# Patient Record
Sex: FEMALE | Race: WHITE | NOT HISPANIC OR LATINO | Employment: FULL TIME | URBAN - METROPOLITAN AREA
[De-identification: names, ages, dates, MRNs, and addresses within clinical notes are randomized per-mention and may not be internally consistent; named-entity substitution may affect disease eponyms.]

---

## 2017-10-30 ENCOUNTER — OFFICE VISIT (OUTPATIENT)
Dept: FAMILY MEDICINE | Facility: CLINIC | Age: 32
End: 2017-10-30
Payer: COMMERCIAL

## 2017-10-30 VITALS
SYSTOLIC BLOOD PRESSURE: 120 MMHG | HEART RATE: 90 BPM | HEIGHT: 64 IN | BODY MASS INDEX: 24.5 KG/M2 | WEIGHT: 143.5 LBS | TEMPERATURE: 98.6 F | RESPIRATION RATE: 12 BRPM | DIASTOLIC BLOOD PRESSURE: 74 MMHG
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
## 2017-10-30 DIAGNOSIS — S83.91XA SPRAIN OF RIGHT KNEE, UNSPECIFIED LIGAMENT, INITIAL ENCOUNTER: Primary | ICD-10-CM

## 2017-10-30 DIAGNOSIS — M25.561 RIGHT KNEE PAIN, UNSPECIFIED CHRONICITY: ICD-10-CM

## 2017-10-30 DIAGNOSIS — Z53.9 ERRONEOUS ENCOUNTER--DISREGARD: Primary | ICD-10-CM

## 2017-10-30 PROCEDURE — 99203 OFFICE O/P NEW LOW 30 MIN: CPT | Performed by: FAMILY MEDICINE

## 2017-10-30 RX ORDER — NORETHINDRONE ACETATE AND ETHINYL ESTRADIOL 1.5-30(21)
KIT ORAL
COMMUNITY
Start: 2009-01-12 | End: 2019-09-23

## 2017-10-30 RX ORDER — NAPROXEN 500 MG/1
500 TABLET ORAL 2 TIMES DAILY WITH MEALS
Qty: 14 TABLET | Refills: 0 | Status: SHIPPED | OUTPATIENT
Start: 2017-10-30 | End: 2017-11-06

## 2017-10-30 NOTE — PROGRESS NOTES
SUBJECTIVE:   Denia Raymond is a 32 year old female who presents to clinic today for the following health issues:      Musculoskeletal problem/pain      Duration: 2 days started yesterday     Description  Location: right knee    Intensity:  moderate    Accompanying signs and symptoms: radiation of pain to  Calf and quads and little swollen    History  Previous similar problem: YES  Previous evaluation:  none    Precipitating or alleviating factors:  Trauma or overuse: YES- walking   Aggravating factors include: standing, walking, climbing stairs, lifting and exercise    Therapies tried and outcome: NSAID - over the counter and tylenol     Noted when walking dog, turned quickly to right and knee and foot didn't move with it. [place] a couple rips inner side of left knee, then started hurting immediately , no swelling per say mild at least .   In past not like this , had PT for right knee when younger , told her quads were developing quicker than calf so knee cap drifting to out and had bone on bone so did PT to strengthen lower calf.   Able to walk on it. Some better than other . Pain ful , not able to walk normally full weight. stairs are hard farida going up stairs. Can flex & Can extend fully but feels funny  Feels like might give way. May buckle on her , not locking up. Stiff from being up last night.   Is a skier    getting over cold,     Moved from [place] last yr. Pap done 2 yrs ago in [place] normal  Gets flu and tdap at work   Works at the [employer] .     Problem list and histories reviewed & adjusted, as indicated.  Additional history: as documented    Patient Active Problem List   Diagnosis     Acne     History reviewed. No pertinent surgical history.    Social History   Substance Use Topics     Smoking status: Never Smoker     Smokeless tobacco: Never Used     Alcohol use Yes      Comment: ocasional     Family History   Problem Relation Age of Onset     DIABETES Father          Current Outpatient Prescriptions  "  Medication Sig Dispense Refill     norethindrone-ethinyl estradiol-iron (LOESTRIN FE 1.5/30) 1.5-30 MG-MCG per tablet        Allergies   Allergen Reactions     Penicillins Rash     rash     No lab results found.   BP Readings from Last 3 Encounters:   10/30/17 120/74    Wt Readings from Last 3 Encounters:   10/30/17 143 lb 8 oz (65.1 kg)                  Labs reviewed in EPIC          Reviewed and updated as needed this visit by clinical staffTobacco  Allergies  Med Hx  Surg Hx  Fam Hx  Soc Hx      Reviewed and updated as needed this visit by Provider         ROS:  Constitutional, HEENT, cardiovascular, pulmonary, GI, , musculoskeletal, neuro, skin, endocrine and psych systems are negative, except as otherwise noted.      OBJECTIVE:   /74 (BP Location: Left arm, Patient Position: Chair, Cuff Size: Adult Regular)  Pulse 90  Temp 98.6  F (37  C) (Oral)  Resp 12  Ht 5' 3.9\" (1.623 m)  Wt 143 lb 8 oz (65.1 kg)  BMI 24.71 kg/m2  Body mass index is 24.71 kg/(m^2).  GENERAL: healthy, alert and no distress  MS: no gross musculoskeletal defects noted, no edema  Right  knee:  Swelling: minimal left medial joint space area  Ecchymosis: no  Gait: stiff, walks with right knee extended,  Squat: unable  Medial tenderness: yes  Lateral tenderness: no  Anterior tenderness: no  Posterior tenderness: no  Extension: almost full  Flexion: almost full  Patellar tracking / apprehension: no  Chanucey's Patellar compression test: no  Mc Chavez's: neg  Lachman's / Anterior drawer: neg  Valgus stress: neg  Varus stress: neg  ITB testing: not tested     Distal CMS: intact  Ipsilateral Hip: full range of motion   Ipsilateral Ankle & Foot: not examined       SKIN: no suspicious lesions or rashes  NEURO: Normal strength and tone, mentation intact and speech normal  PSYCH: mentation appears normal, affect normal/bright    Diagnostic Test Results:  No results found for this or any previous visit (from the past 24 " hour(s)).    ASSESSMENT/PLAN:     1. Sprain of right knee, unspecified ligament, initial encounter  New to this clinic and myself, no records to review prior to apt time and apt time limited. [place]  negative. Here about her right knee.   Exam benign, suspect knee sprain/ mild meniscal medial injury. Xray knee today at [facility] or here later as our machine is down. Ice then heat twice a day as needed, ace wrap for comfort, elevate, Brace and crutches , toe touch weight bearing until see specialists. See physical therapy and orthopedics for further evaluation and management. Go to the ER if worse. Return to primary of choice for physical / pap etc. Recommend flu shot yearly. Due also for Tdap. Reports will get at the  where works. Sign a release so we can update records if planning to continue care here. Moved from [place]. PAP normal 2 yrs ago there.  - XR Knee Right 3 Views; Future  - [facility] PT, HAND, AND CHIROPRACTIC REFERRAL  - ORTHO  REFERRAL  - order for DME; Knee brace  Dispense: 1 each; Refill: 0  - order for DME; crutches  Dispense: 1 each; Refill: 0  - naproxen (NAPROSYN) 500 MG tablet; Take 1 tablet (500 mg) by mouth 2 times daily (with meals) for 7 days  Dispense: 14 tablet; Refill: 0    2. Right knee pain, unspecified chronicity  - XR Knee Right 3 Views; Future  - [facility] PT, HAND, AND CHIROPRACTIC REFERRAL  - ORTHO  REFERRAL  - order for DME; Knee brace  Dispense: 1 each; Refill: 0  - order for DME; crutches  Dispense: 1 each; Refill: 0  - naproxen (NAPROSYN) 500 MG tablet; Take 1 tablet (500 mg) by mouth 2 times daily (with meals) for 7 days  Dispense: 14 tablet; Refill: 0    3. Skier  Should see ortho so can ski in the future and this injury raza snot compromise her functioning  - XR Knee Right 3 Views; Future  - [facility] PT, HAND, AND CHIROPRACTIC REFERRAL  - ORTHO  REFERRAL  - order for DME; Knee brace  Dispense: 1 each; Refill: 0  - order for DME; crutches  Dispense: 1 each; Refill:  0  - naproxen (NAPROSYN) 500 MG tablet; Take 1 tablet (500 mg) by mouth 2 times daily (with meals) for 7 days  Dispense: 14 tablet; Refill: 0    See Patient Instructions  Patient Instructions   Xray knee   Brace and crutches , toe touch weight bearing until see specialist   See physical therapy and orthopedics for further evaluation and management  Go to the ER if worse  Return to primary of choice for physical / pap etc  Recommend flu shot yearly   Due also for Tdap  Sign a release so we can update records if planning to continue care here       Arlene Samayoa MD  [facility]

## 2017-10-30 NOTE — MR AVS SNAPSHOT
After Visit Summary   10/30/2017    Denia Raymond    MRN: [id number]           Patient Information     Date Of Birth          1985        Visit Information        Provider Department      10/30/2017 1:40 PM Arlene Samayoa MD [facility]        Today's Diagnoses     Sprain of right knee, unspecified ligament, initial encounter    -  1    Right knee pain, unspecified chronicity        Skier          Care Instructions    Xray knee   Brace and crutches , toe touch weight bearing until see specialist   See physical therapy and orthopedics for further evaluation and management  Go to the ER if worse  Return to primary of choice for physical / pap etc  Recommend flu shot yearly   Due also for Tdap  Sign a release so we can update records if planning to continue care here           Follow-ups after your visit        Additional Services     [facility] PT, HAND, AND CHIROPRACTIC REFERRAL       **This order will print in the [facility] Scheduling Office**    Physical Therapy, Hand Therapy and Chiropractic Care are available through:    *[facility]  *[facility]  *[facility]    Call one number to schedule at any of the above locations: [phone].    Your provider has referred you to: Physical Therapy at [facility] or [facility]    Indication/Reason for Referral: Knee Pain  Onset of Illness: ?  Therapy Orders: Evaluate and Treat  Special Programs: None  Special Request: None    Ezekiel Falcon      Additional Comments for the Therapist or Chiropractor:     Please be aware that coverage of these services is subject to the terms and limitations of your health insurance plan.  Call member services at your health plan with any benefit or coverage questions.      Please bring the following to your appointment:    *Your personal calendar for scheduling future appointments  *Comfortable clothing            ORTHO  REFERRAL       [facility] is  referring you to the Orthopedic  Services at [facility].       The  Representative will assist you in the coordination of your Orthopedic and Musculoskeletal Care as prescribed by your physician.    The  Representative will call you within 1 business day to help schedule your appointment, or you may contact the  Representative at:    All areas ~ [phone]     Type of Referral : Non Surgical       Timeframe requested: 3 - 5 days    Coverage of these services is subject to the terms and limitations of your health insurance plan.  Please call member services at your health plan with any benefit or coverage questions.      If X-rays, CT or MRI's have been performed, please contact the facility where they were done to arrange for , prior to your scheduled appointment.  Please bring this referral request to your appointment and present it to your specialist.                  Future tests that were ordered for you today     Open Future Orders        Priority Expected Expires Ordered    XR Knee Right 3 Views Routine 10/30/2017 10/30/2018 10/30/2017            Who to contact     If you have questions or need follow up information about today's clinic visit or your schedule please contact [facility] directly at [phone].  Normal or non-critical lab and imaging results will be communicated to you by MyChart, letter or phone within 4 business days after the clinic has received the results. If you do not hear from us within 7 days, please contact the clinic through MyChart or phone. If you have a critical or abnormal lab result, we will notify you by phone as soon as possible.  Submit refill requests through [employer] or call your pharmacy and they will forward the refill request to us. Please allow 3 business days for your refill to be completed.          Additional Information About Your Visit        [employer] Information     [employer] lets  "you send messages to your doctor, view your test results, renew your prescriptions, schedule appointments and more. To sign up, go to [email or web address] . Click on \"Log in\" on the left side of the screen, which will take you to the Welcome page. Then click on \"Sign up Now\" on the right side of the page.     You will be asked to enter the access code listed below, as well as some personal information. Please follow the directions to create your username and password.     Your access code is: [email or web address]  Expires: 2018  2:19 PM     Your access code will  in 90 days. If you need help or a new code, please call your [facility] [place] clinic or [phone].        Care EveryWhere ID     This is your Care EveryWhere ID. This could be used by other organizations to access your [facility] [place] medical records  [id number]        Your Vitals Were     Pulse Temperature Respirations Height BMI (Body Mass Index)       90 98.6  F (37  C) (Oral) 12 5' 3.9\" (1.623 m) 24.71 kg/m2        Blood Pressure from Last 3 Encounters:   10/30/17 120/74    Weight from Last 3 Encounters:   10/30/17 143 lb 8 oz (65.1 kg)              We Performed the Following     [facility] PT, HAND, AND CHIROPRACTIC REFERRAL     ORTHO  REFERRAL          Today's Medication Changes          These changes are accurate as of: 10/30/17  2:19 PM.  If you have any questions, ask your nurse or doctor.               Start taking these medicines.        Dose/Directions    naproxen 500 MG tablet   Commonly known as:  NAPROSYN   Used for:  Sprain of right knee, unspecified ligament, initial encounter, Right knee pain, unspecified chronicity, Skier   Started by:  Arlene Samayoa MD        Dose:  500 mg   Take 1 tablet (500 mg) by mouth 2 times daily (with meals) for 7 days   Quantity:  14 tablet   Refills:  0       * order for DME   Used for:  Right knee pain, unspecified chronicity, Sprain of right knee, unspecified ligament, initial encounter, Skier   Started by:  " Arlene Samayoa MD        Knee brace   Quantity:  1 each   Refills:  0       * order for DME   Used for:  Sprain of right knee, unspecified ligament, initial encounter, Right knee pain, unspecified chronicity, Skier   Started by:  Arlene Samayoa MD        crutches   Quantity:  1 each   Refills:  0       * Notice:  This list has 2 medication(s) that are the same as other medications prescribed for you. Read the directions carefully, and ask your doctor or other care provider to review them with you.         Where to get your medicines      These medications were sent to [id number] - [place], [place] - [address], [place] [address]     Phone:  [phone]     naproxen 500 MG tablet         Some of these will need a paper prescription and others can be bought over the counter.  Ask your nurse if you have questions.     Bring a paper prescription for each of these medications     order for DME    order for DME                Primary Care Provider    None Specified       No primary provider on file.        Equal Access to Services     MANISHA TANNER : Hadii gillian ku hadasho Soomaali, waaxda luqadaha, qaybta kaalmada adeegyada, waxay steve dejesus . So [facility] [phone].    ATENCIÓN: Si habla español, tiene a salgado disposición servicios gratuitos de asistencia lingüística. Llame al [phone].    We comply with applicable federal civil rights laws and [place] laws. We do not discriminate on the basis of race, color, national origin, age, disability, sex, sexual orientation, or gender identity.            Thank you!     Thank you for choosing [facility]  for your care. Our goal is always to provide you with excellent care. Hearing back from our patients is one way we can continue to improve our services. Please take a few minutes to complete the written survey that you may receive in the mail after your visit with us.  Thank you!             Your Updated Medication List - Protect others around you: Learn how to safely use, store and throw away your medicines at www.disposemymeds.org.          This list is accurate as of: 10/30/17  2:19 PM.  Always use your most recent med list.                   Brand Name Dispense Instructions for use Diagnosis    LOESTRIN FE 1.5/30 1.5-30 MG-MCG per tablet   Generic drug:  norethindrone-ethinyl estradiol-iron           naproxen 500 MG tablet    NAPROSYN    14 tablet    Take 1 tablet (500 mg) by mouth 2 times daily (with meals) for 7 days    Sprain of right knee, unspecified ligament, initial encounter, Right knee pain, unspecified chronicity, Skier       * order for DME     1 each    Knee brace    Right knee pain, unspecified chronicity, Sprain of right knee, unspecified ligament, initial encounter, Skier       * order for DME     1 each    crutches    Sprain of right knee, unspecified ligament, initial encounter, Right knee pain, unspecified chronicity, Skier       * Notice:  This list has 2 medication(s) that are the same as other medications prescribed for you. Read the directions carefully, and ask your doctor or other care provider to review them with you.

## 2017-10-30 NOTE — NURSING NOTE
"Chief Complaint   Patient presents with     Musculoskeletal Problem     knee       Initial /74 (BP Location: Left arm, Patient Position: Chair, Cuff Size: Adult Regular)  Pulse 90  Temp 98.6  F (37  C) (Oral)  Resp 12  Ht 5' 3.9\" (1.623 m)  Wt 143 lb 8 oz (65.1 kg)  BMI 24.71 kg/m2 Estimated body mass index is 24.71 kg/(m^2) as calculated from the following:    Height as of this encounter: 5' 3.9\" (1.623 m).    Weight as of this encounter: 143 lb 8 oz (65.1 kg).  Medication Reconciliation: complete Jeffery Aquino MA      "

## 2017-11-06 ENCOUNTER — THERAPY VISIT (OUTPATIENT)
Dept: PHYSICAL THERAPY | Facility: CLINIC | Age: 32
End: 2017-11-06
Payer: COMMERCIAL

## 2017-11-06 DIAGNOSIS — M25.561 ACUTE PAIN OF RIGHT KNEE: Primary | ICD-10-CM

## 2017-11-06 PROCEDURE — 97110 THERAPEUTIC EXERCISES: CPT | Mod: GP | Performed by: PHYSICAL THERAPIST

## 2017-11-06 PROCEDURE — 97161 PT EVAL LOW COMPLEX 20 MIN: CPT | Mod: GP | Performed by: PHYSICAL THERAPIST

## 2017-11-06 ASSESSMENT — ACTIVITIES OF DAILY LIVING (ADL)
KNEE_ACTIVITY_OF_DAILY_LIVING_SUM: 28
AS_A_RESULT_OF_YOUR_KNEE_INJURY,_HOW_WOULD_YOU_RATE_YOUR_CURRENT_LEVEL_OF_DAILY_ACTIVITY?: ABNORMAL
WEAKNESS: THE SYMPTOM AFFECTS MY ACTIVITY SLIGHTLY
SIT WITH YOUR KNEE BENT: I AM UNABLE TO DO THE ACTIVITY
STAND: ACTIVITY IS NOT DIFFICULT
KNEEL ON THE FRONT OF YOUR KNEE: I AM UNABLE TO DO THE ACTIVITY
GIVING WAY, BUCKLING OR SHIFTING OF KNEE: THE SYMPTOM AFFECTS MY ACTIVITY SLIGHTLY
STIFFNESS: THE SYMPTOM AFFECTS MY ACTIVITY MODERATELY
GO DOWN STAIRS: ACTIVITY IS VERY DIFFICULT
PAIN: THE SYMPTOM AFFECTS MY ACTIVITY MODERATELY
GO UP STAIRS: ACTIVITY IS VERY DIFFICULT
SQUAT: I AM UNABLE TO DO THE ACTIVITY
LIMPING: THE SYMPTOM AFFECTS MY ACTIVITY MODERATELY
KNEE_ACTIVITY_OF_DAILY_LIVING_SCORE: 40
HOW_WOULD_YOU_RATE_THE_OVERALL_FUNCTION_OF_YOUR_KNEE_DURING_YOUR_USUAL_DAILY_ACTIVITIES?: ABNORMAL
WALK: ACTIVITY IS SOMEWHAT DIFFICULT
SWELLING: THE SYMPTOM AFFECTS MY ACTIVITY SLIGHTLY
RISE FROM A CHAIR: ACTIVITY IS SOMEWHAT DIFFICULT
RAW_SCORE: 28
HOW_WOULD_YOU_RATE_THE_CURRENT_FUNCTION_OF_YOUR_KNEE_DURING_YOUR_USUAL_DAILY_ACTIVITIES_ON_A_SCALE_FROM_0_TO_100_WITH_100_BEING_YOUR_LEVEL_OF_KNEE_FUNCTION_PRIOR_TO_YOUR_INJURY_AND_0_BEING_THE_INABILITY_TO_PERFORM_ANY_OF_YOUR_USUAL_DAILY_ACTIVITIES?: 70

## 2017-11-06 NOTE — PROGRESS NOTES
Subjective:    Patient is a 32 year old female presenting with rehab right knee hpi.   Denia Raymond is a 32 year old female with a right knee condition.  Condition occurred with:  A twist.  Condition occurred: in the community.  This is a new condition  October 29, 2017 - pt was walking and twisted to the right but her foot did not move so she twisted her R knee. [place]/felt 3 rips. Immediate pain but never noted any swelling or bruising. Was able to still walk home. Pain is inside and above knee. Was given crutches but not using much anymore. Has been wearing knee brace though.   Is a skier and a runner and and would like to return to run a few miles a few times a week. Would like to return to elliptical/bike.  Would like to be able to bike from work to here..    Patient reports pain:  Medial (superior).  Radiates to: first few nights had pain up the thigh and into hip, but that is better now.  Pain is described as aching (occasional sharp pain above knee) and is intermittent and reported as 4/10 (at worst recently).  Associated symptoms:  Loss of motion/stiffness (did not notice swelling). Pain is worse during the day.  Symptoms are exacerbated by ascending stairs, descending stairs, bending/squatting, walking and other (sitting with knee bent ) and relieved by ice and bracing/immobilizing.  Since onset symptoms are gradually improving.  Special testing: none.  Previous treatment: none.  Improvement with previous treatment: NA.  General health as reported by patient is good.                                              Objective:      Gait:  Has crutches but really not using them much  Gait Type:  Antalgic   Weight Bearing Status:  WBAT   Assistive Devices:  Brace  Deviations:  Knee:  Knee extension decr R                                                 Hip Evaluation    Hip Strength:    Flexion:   Left: 5/5   Pain:  Right: 5-/5   Pain:                    Extension:  Left: 5/5  Pain:Right: 5-/5    Pain:     Abduction:  Left: 5-/5     Pain:Right: 4+/5    Pain:                           Knee Evaluation:  ROM:    AROM    Hyperextension: Left:  2    Right:  Extension: Left:    Right:  11    PROM    Hyperextension: Left: 2   Right:   Extension: Left:   Right:  3  Flexion: Left: 140    Right:  140      Strength:     Extension:  Left: 5/5   Pain:      Right: 4-/5    Pain:++  Flexion:  Left: 5/5   Pain:      Right: 5/5   Pain:    Quad Set Left: Good    Pain:   Quad Set Right: Fair    Pain:  Ligament Testing:      Varus 30:  Right:  Neg    Valgus 30:  Right:  Neg  Anterior Drawer:  Right:  Neg  Posterior Drawer: Right:  Neg    Special Tests: Special test for knee: (-) McMurrays and (-) Apleys.      Right knee negative for the following special tests:  Meniscal and Patellar Compression  Palpation:  Palpation of knee: (-) TTP at medial femoral condyle and medial fat pad. (+) TTP 1 in prox to superior patella pole (no tenderness on patella). but no tenderness proximally into quadriceps.    Right knee tenderness present at:  Patellar Superior  Right knee tenderness not present at:  Medial Joint Line; Lateral Joint Line; Patellar Tendon; Patellar Medial; Patellar Lateral and Patellar Inferior  Edema:  Normal    Mobility Testing:  Normal            Functional Testing:          Quad:    Single Leg Squat:  Left:       Right:        Bilateral Leg Squat:  Partial depth only, shift into L LE, mild superior knee pain  Mild loss of control              General   Repeated knee extension in sitting with OP: results in large decr in pain with gait, decr antalgic gait. incr MMT knee extension to 5-/5 and significant decr in pain, and PROM achieves full extension  ROS    Assessment/Plan:      Patient is a 32 year old female with right side knee complaints.    Patient has the following significant findings with corresponding treatment plan.                Diagnosis 1:  Right knee pain/derangement    Pain -  hot/cold therapy,  splint/taping/bracing/orthotics, self management, education, directional preference exercise and home program  Decreased ROM/flexibility - manual therapy, therapeutic exercise and home program  Decreased strength - therapeutic exercise, therapeutic activities and home program  Impaired gait - home program and therapeutic exercise  Impaired muscle performance - neuro re-education and home program  Decreased function - therapeutic activities and home program    Therapy Evaluation Codes:   1) History comprised of:   Personal factors that impact the plan of care:      None.    Comorbidity factors that impact the plan of care are:      None.     Medications impacting care: None.  2) Examination of Body Systems comprised of:   Body structures and functions that impact the plan of care:      Knee.   Activity limitations that impact the plan of care are:      Running, Sitting, Sports, Squatting/kneeling, Stairs and Walking.  3) Clinical presentation characteristics are:   Stable/Uncomplicated.  4) Decision-Making    Low complexity using standardized patient assessment instrument and/or measureable assessment of functional outcome.  Cumulative Therapy Evaluation is: Low complexity.    Previous and current functional limitations:  (See Goal Flow Sheet for this information)    Short term and Long term goals: (See Goal Flow Sheet for this information)     Communication ability:  Patient appears to be able to clearly communicate and understand verbal and written communication and follow directions correctly.  Treatment Explanation - The following has been discussed with the patient:   RX ordered/plan of care  Anticipated outcomes  Possible risks and side effects  This patient would benefit from PT intervention to resume normal activities.   Rehab potential is excellent.    Frequency:  1 X week, once daily  Duration:  for 4 weeks tapering to 2 X a month over 6 weeks  Discharge Plan:  Achieve all LTG.  Independent in home  treatment program.  Reach maximal therapeutic benefit.    Please refer to the daily flowsheet for treatment today, total treatment time and time spent performing 1:1 timed codes.

## 2017-11-06 NOTE — MR AVS SNAPSHOT
"              After Visit Summary   11/6/2017    Denia Raymond    MRN: [id number]           Patient Information     Date Of Birth          1985        Visit Information        Provider Department      11/6/2017 4:40 PM Melissa Jimenez PT [facility]        Today's Diagnoses     Acute pain of right knee    -  1       Follow-ups after your visit        Your next 10 appointments already scheduled     Nov 13, 2017  4:40 PM CST   CORAL Extremity with Melissa Jimenez PT   [facility] ([facility])    [address] [place] [id number] [phone]            Nov 20, 2017  4:40 PM CST   CORAL Extremity with Melissa Jimenez PT   [facility] ([facility])    [address] [place] [id number] [phone]              Who to contact     If you have questions or need follow up information about today's clinic visit or your schedule please contact [facility] directly at [phone].  Normal or non-critical lab and imaging results will be communicated to you by SportsBUZZhart, letter or phone within 4 business days after the clinic has received the results. If you do not hear from us within 7 days, please contact the clinic through WTFastt or phone. If you have a critical or abnormal lab result, we will notify you by phone as soon as possible.  Submit refill requests through EnzymeRx or call your pharmacy and they will forward the refill request to us. Please allow 3 business days for your refill to be completed.          Additional Information About Your Visit        SportsBUZZhart Information     EnzymeRx lets you send messages to your doctor, view your test results, renew your prescriptions, schedule appointments and more. To sign up, go to [email or web address] . Click on \"Log in\" on the left side of the screen, which will take you to the Welcome " "page. Then click on \"Sign up Now\" on the right side of the page.     You will be asked to enter the access code listed below, as well as some personal information. Please follow the directions to create your username and password.     Your access code is: [email or web address]  Expires: 2018  1:19 PM     Your access code will  in 90 days. If you need help or a new code, please call your [place] clinic or [phone].        Care EveryWhere ID     This is your Care EveryWhere ID. This could be used by other organizations to access your [place] medical records  [id number]         Blood Pressure from Last 3 Encounters:   10/30/17 120/74    Weight from Last 3 Encounters:   10/30/17 65.1 kg (143 lb 8 oz)              We Performed the Following     HC PT EVAL, LOW COMPLEXITY     CORAL INITIAL EVAL REPORT     THERAPEUTIC EXERCISES        Primary Care Provider    None Specified       No primary provider on file.        Equal Access to Services     MEAGHAN TANNER : Hadii gillian Mays, waaxda andrew, qaybta kaalmada adewally, eneraj dejesus . So [facility] [phone].    ATENCIÓN: Si habla español, tiene a salgado disposición servicios gratuitos de asistencia lingüística. Llame al [phone].    We comply with applicable federal civil rights laws and [place] laws. We do not discriminate on the basis of race, color, national origin, age, disability, sex, sexual orientation, or gender identity.            Thank you!     Thank you for choosing [facility] [place]  for your care. Our goal is always to provide you with excellent care. Hearing back from our patients is one way we can continue to improve our services. Please take a few minutes to complete the written survey that you may receive in the mail after your visit with us. Thank you!             Your Updated Medication List - Protect others around you: Learn how to safely use, store and throw away your medicines at " www.disposemymeds.org.          This list is accurate as of: 11/6/17  8:17 PM.  Always use your most recent med list.                   Brand Name Dispense Instructions for use Diagnosis    LOESTRIN FE 1.5/30 1.5-30 MG-MCG per tablet   Generic drug:  norethindrone-ethinyl estradiol-iron           naproxen 500 MG tablet    NAPROSYN    14 tablet    Take 1 tablet (500 mg) by mouth 2 times daily (with meals) for 7 days    Sprain of right knee, unspecified ligament, initial encounter, Right knee pain, unspecified chronicity, Skier       * order for DME     1 each    Knee brace    Right knee pain, unspecified chronicity, Sprain of right knee, unspecified ligament, initial encounter, Skier       * order for DME     1 each    crutches    Sprain of right knee, unspecified ligament, initial encounter, Right knee pain, unspecified chronicity, Skier       * Notice:  This list has 2 medication(s) that are the same as other medications prescribed for you. Read the directions carefully, and ask your doctor or other care provider to review them with you.

## 2017-11-07 NOTE — PROGRESS NOTES
Subjective:    Patient is a 32 year old female presenting with rehab left ankle/foot hpi.                    and reported as 4/10.                General health as reported by patient is good.        Current medications:  Anti-inflammatory.  Current occupation is [employer].                                    Objective:    System    Physical Exam    General     ROS    Assessment/Plan:

## 2017-11-13 ENCOUNTER — THERAPY VISIT (OUTPATIENT)
Dept: PHYSICAL THERAPY | Facility: CLINIC | Age: 32
End: 2017-11-13
Payer: COMMERCIAL

## 2017-11-13 DIAGNOSIS — M25.561 ACUTE PAIN OF RIGHT KNEE: ICD-10-CM

## 2017-11-13 PROCEDURE — 97112 NEUROMUSCULAR REEDUCATION: CPT | Mod: GP | Performed by: PHYSICAL THERAPIST

## 2017-11-13 PROCEDURE — 97110 THERAPEUTIC EXERCISES: CPT | Mod: GP | Performed by: PHYSICAL THERAPIST

## 2017-11-20 ENCOUNTER — THERAPY VISIT (OUTPATIENT)
Dept: PHYSICAL THERAPY | Facility: CLINIC | Age: 32
End: 2017-11-20
Payer: COMMERCIAL

## 2017-11-20 DIAGNOSIS — M25.561 ACUTE PAIN OF RIGHT KNEE: ICD-10-CM

## 2017-11-20 PROCEDURE — 97110 THERAPEUTIC EXERCISES: CPT | Mod: GP | Performed by: PHYSICAL THERAPIST

## 2017-11-20 PROCEDURE — 97112 NEUROMUSCULAR REEDUCATION: CPT | Mod: GP | Performed by: PHYSICAL THERAPIST

## 2017-11-20 PROCEDURE — 97530 THERAPEUTIC ACTIVITIES: CPT | Mod: GP | Performed by: PHYSICAL THERAPIST

## 2017-11-20 ASSESSMENT — ACTIVITIES OF DAILY LIVING (ADL)
SWELLING: I DO NOT HAVE THE SYMPTOM
PAIN: THE SYMPTOM AFFECTS MY ACTIVITY SLIGHTLY
AS_A_RESULT_OF_YOUR_KNEE_INJURY,_HOW_WOULD_YOU_RATE_YOUR_CURRENT_LEVEL_OF_DAILY_ACTIVITY?: NEARLY NORMAL
STIFFNESS: I HAVE THE SYMPTOM BUT IT DOES NOT AFFECT MY ACTIVITY
WALK: ACTIVITY IS NOT DIFFICULT
SIT WITH YOUR KNEE BENT: ACTIVITY IS NOT DIFFICULT
GIVING WAY, BUCKLING OR SHIFTING OF KNEE: THE SYMPTOM AFFECTS MY ACTIVITY SLIGHTLY
KNEE_ACTIVITY_OF_DAILY_LIVING_SUM: 59
SQUAT: ACTIVITY IS MINIMALLY DIFFICULT
WEAKNESS: I HAVE THE SYMPTOM BUT IT DOES NOT AFFECT MY ACTIVITY
RAW_SCORE: 59
KNEE_ACTIVITY_OF_DAILY_LIVING_SCORE: 84.29
GO UP STAIRS: ACTIVITY IS MINIMALLY DIFFICULT
KNEEL ON THE FRONT OF YOUR KNEE: ACTIVITY IS MINIMALLY DIFFICULT
RISE FROM A CHAIR: ACTIVITY IS NOT DIFFICULT
LIMPING: I DO NOT HAVE THE SYMPTOM
HOW_WOULD_YOU_RATE_THE_CURRENT_FUNCTION_OF_YOUR_KNEE_DURING_YOUR_USUAL_DAILY_ACTIVITIES_ON_A_SCALE_FROM_0_TO_100_WITH_100_BEING_YOUR_LEVEL_OF_KNEE_FUNCTION_PRIOR_TO_YOUR_INJURY_AND_0_BEING_THE_INABILITY_TO_PERFORM_ANY_OF_YOUR_USUAL_DAILY_ACTIVITIES?: 85
STAND: ACTIVITY IS NOT DIFFICULT
GO DOWN STAIRS: ACTIVITY IS SOMEWHAT DIFFICULT
HOW_WOULD_YOU_RATE_THE_OVERALL_FUNCTION_OF_YOUR_KNEE_DURING_YOUR_USUAL_DAILY_ACTIVITIES?: NEARLY NORMAL

## 2017-11-20 NOTE — MR AVS SNAPSHOT
"              After Visit Summary   11/20/2017    Denia Raymond    MRN: [id number]           Patient Information     Date Of Birth          1985        Visit Information        Provider Department      11/20/2017 4:40 PM Melissa Jimenez PT [facility]        Today's Diagnoses     Acute pain of right knee           Follow-ups after your visit        Your next 10 appointments already scheduled     Dec 06, 2017  4:40 PM CST   CORAL Extremity with Melissa Jimenez PT   [facility] [place] ([facility])    [facility] [facility] [address] [phone]              Who to contact     If you have questions or need follow up information about today's clinic visit or your schedule please contact [facility] directly at [phone].  Normal or non-critical lab and imaging results will be communicated to you by Contact Solutionshart, letter or phone within 4 business days after the clinic has received the results. If you do not hear from us within 7 days, please contact the clinic through MyChart or phone. If you have a critical or abnormal lab result, we will notify you by phone as soon as possible.  Submit refill requests through Zefanclub or call your pharmacy and they will forward the refill request to us. Please allow 3 business days for your refill to be completed.          Additional Information About Your Visit        Contact Solutionshart Information     Zefanclub lets you send messages to your doctor, view your test results, renew your prescriptions, schedule appointments and more. To sign up, go to [email or web address] . Click on \"Log in\" on the left side of the screen, which will take you to the Welcome page. Then click on \"Sign up Now\" on the right side of the page.     You will be asked to enter the access code listed below, as well as some personal information. Please follow the directions to create your username and " password.     Your access code is: [email or web address]  Expires: 2018  1:19 PM     Your access code will  in 90 days. If you need help or a new code, please call your [facility] or [phone].        Care EveryWhere ID     This is your Care EveryWhere ID. This could be used by other organizations to access your [place] medical records  [id number]         Blood Pressure from Last 3 Encounters:   10/30/17 120/74    Weight from Last 3 Encounters:   10/30/17 65.1 kg (143 lb 8 oz)              We Performed the Following     NEUROMUSCULAR RE-EDUCATION     THERAPEUTIC ACTIVITIES     THERAPEUTIC EXERCISES        Primary Care Provider    Physician No Ref-Primary       NO REF-PRIMARY PHYSICIAN        Equal Access to Services     MANISHA TANNER : Hadii gillian rico hadasho Sovira, waaxda luqadaha, qaybta kaalmada adeegyada, neeraj dejesus . So [facility] [phone].    ATENCIÓN: Si habla español, tiene a salgado disposición servicios gratuitos de asistencia lingüística. Llame al [phone].    We comply with applicable federal civil rights laws and [place] laws. We do not discriminate on the basis of race, color, national origin, age, disability, sex, sexual orientation, or gender identity.            Thank you!     Thank you for choosing [facility] [facility] [place]  for your care. Our goal is always to provide you with excellent care. Hearing back from our patients is one way we can continue to improve our services. Please take a few minutes to complete the written survey that you may receive in the mail after your visit with us. Thank you!             Your Updated Medication List - Protect others around you: Learn how to safely use, store and throw away your medicines at www.disposemymeds.org.          This list is accurate as of: 17  5:18 PM.  Always use your most recent med list.                   Brand Name Dispense Instructions for use Diagnosis    LOESTRIN FE   1.5-30 MG-MCG per tablet   Generic drug:  norethindrone-ethinyl estradiol-iron           * order for DME     1 each    Knee brace    Right knee pain, unspecified chronicity, Sprain of right knee, unspecified ligament, initial encounter, Skier       * order for DME     1 each    crutches    Sprain of right knee, unspecified ligament, initial encounter, Right knee pain, unspecified chronicity, Skier       * Notice:  This list has 2 medication(s) that are the same as other medications prescribed for you. Read the directions carefully, and ask your doctor or other care provider to review them with you.

## 2018-03-07 PROBLEM — M25.561 ACUTE PAIN OF RIGHT KNEE: Status: RESOLVED | Noted: 2017-11-06 | Resolved: 2018-03-07

## 2018-03-07 NOTE — PROGRESS NOTES
Patient did not return to Physical Therapy to complete their plan of care, thus, full DC status is unknown. Please refer to the SOAP note dated 11/20/17 as well as subjective and objective reports copied below from the last visit for discharge information.   Subjective: pt reports the knee has been really good this past week. gets stiff when it is colder out. stairs have been much better, still a little painful at top of knee. reports just gets stiff after sitting awhile, but not as painful. working hard to walk normally.   Objective: much improved gait at beginning of session. knee extension 0 deg. slight ant knee excursion with squatting, corrects well with cues. slight lateral knee pain, helps with BTB around knees, but then increased pain again after a few reps. pain abolished with use of rep knee ext in standing  Their bout of care ranged from 11/6/17 to 11/20/17, for 3 visits. Discharge patient from PT at this time.

## 2019-09-23 ENCOUNTER — OFFICE VISIT (OUTPATIENT)
Dept: OBGYN | Facility: CLINIC | Age: 34
End: 2019-09-23
Payer: COMMERCIAL

## 2019-09-23 VITALS
HEART RATE: 89 BPM | WEIGHT: 128 LBS | SYSTOLIC BLOOD PRESSURE: 115 MMHG | BODY MASS INDEX: 22.04 KG/M2 | TEMPERATURE: 97.7 F | DIASTOLIC BLOOD PRESSURE: 77 MMHG

## 2019-09-23 DIAGNOSIS — Z00.00 ANNUAL PHYSICAL EXAM: Primary | ICD-10-CM

## 2019-09-23 PROCEDURE — G0145 SCR C/V CYTO,THINLAYER,RESCR: HCPCS | Performed by: OBSTETRICS & GYNECOLOGY

## 2019-09-23 PROCEDURE — 99385 PREV VISIT NEW AGE 18-39: CPT | Performed by: OBSTETRICS & GYNECOLOGY

## 2019-09-23 PROCEDURE — 87624 HPV HI-RISK TYP POOLED RSLT: CPT | Performed by: OBSTETRICS & GYNECOLOGY

## 2019-09-23 RX ORDER — NORETHINDRONE ACETATE AND ETHINYL ESTRADIOL 1.5-30(21)
1 KIT ORAL DAILY
Qty: 84 TABLET | Refills: 3 | Status: SHIPPED | OUTPATIENT
Start: 2019-09-23 | End: 2019-12-10

## 2019-09-23 NOTE — LETTER
October 2, 2019    Denia Raymond  [address]   [place] [address]    Dear ,  This letter is regarding your recent Pap smear (cervical cancer screening) and Human Papillomavirus (HPV) test.  We are happy to inform you that your Pap smear result is normal. Cervical cancer is closely linked with certain types of HPV. Your results showed no evidence of high-risk HPV.  We recommend you have your next PAP smear in 3 years.  You will still need to return to the clinic every year for an annual exam and other preventive tests.  If you have additional questions regarding this result, please call our registered nurse, Gladis at [phone].  Sincerely,    Yu Bruner MD /[facility]

## 2019-09-23 NOTE — NURSING NOTE
"Chief Complaint   Patient presents with     Physical       Initial /77 (BP Location: Left arm, Patient Position: Sitting, Cuff Size: Adult Regular)   Pulse 89   Temp 97.7  F (36.5  C) (Oral)   Wt 58.1 kg (128 lb)   LMP 2019   BMI 22.04 kg/m   Estimated body mass index is 22.04 kg/m  as calculated from the following:    Height as of 10/30/17: 1.623 m (5' 3.9\").    Weight as of this encounter: 58.1 kg (128 lb).  BP completed using cuff size: regular    Questioned patient about current smoking habits.  Pt. has never smoked.          The following HM Due: pap smear      The following patient reported/Care Every where data was sent to:  P ABSTRACT QUALITY INITIATIVES [[id number]]  NATALIE Galvez MA           "

## 2019-09-23 NOTE — PROGRESS NOTES
Denia is a 34 year old  female who presents for annual exam.     Menses are regular q 28-30 days and normal lasting 3 days.  Menses flow: normal.  LMP 2019. Using oral contraceptives for contraception.  She is not currently considering pregnancy.  Besides routine health maintenance, she has no other health concerns today .  GYNECOLOGIC HISTORY:  Menarche: 11  Age at first intercourse: 14 Number of lifetime partners: more than 6  Denia is sexually active with 1male partner(s) and is currently in monogamous relationship with significant other.    History sexually transmitted infections:No STD history  STI testing offered?  Declined  JOE exposure: No  History of abnormal Pap smear: NO - age 30- 65 PAP every 3 years recommended  Family history of breast CA: No  Family history of uterine/ovarian CA: No    Family history of colon CA: No    HEALTH MAINTENANCE:  Cholesterol: (No results found for: CHOL History of abnormal lipids: No  Mammo: 0 . History of abnormal Mammo: YES, No, Not applicable.  Regular Self Breast Exams: No  Calcium/Vitamin D intake: source:  dietary supplement(s) Adequate? Yes  TSH: (No results found for: TSH )  Pap; (No results found for: PAP )    HISTORY:  OB History    Para Term  AB Living   0 0 0 0 0 0   SAB TAB Ectopic Multiple Live Births   0 0 0 0 0     No past medical history on file.  No past surgical history on file.  Family History   Problem Relation Age of Onset     Diabetes Father      Social History     Socioeconomic History     Marital status: Single     Spouse name: None     Number of children: None     Years of education: None     Highest education level: None   Occupational History     None   Social Needs     Financial resource strain: None     Food insecurity:     Worry: None     Inability: None     Transportation needs:     Medical: None     Non-medical: None   Tobacco Use     Smoking status: Never Smoker     Smokeless tobacco: Never Used   Substance and  Sexual Activity     Alcohol use: Yes     Comment: ocasional     Drug use: No     Sexual activity: Yes     Partners: Male   Lifestyle     Physical activity:     Days per week: None     Minutes per session: None     Stress: None   Relationships     Social connections:     Talks on phone: None     Gets together: None     Attends Gnosticism service: None     Active member of club or organization: None     Attends meetings of clubs or organizations: None     Relationship status: None     Intimate partner violence:     Fear of current or ex partner: None     Emotionally abused: None     Physically abused: None     Forced sexual activity: None   Other Topics Concern     Parent/sibling w/ CABG, MI or angioplasty before 65F 55M? Not Asked   Social History Narrative            Current Outpatient Medications:      norethindrone-ethinyl estradiol-iron (LOESTRIN FE 1.5/30) 1.5-30 MG-MCG per tablet, , Disp: , Rfl:      order for DME, Knee brace, Disp: 1 each, Rfl: 0     order for DME, crutches, Disp: 1 each, Rfl: 0     Allergies   Allergen Reactions     Penicillins Rash     rash       Past medical, surgical, social and family history were reviewed and updated in EPIC.    ROS:   C:     NEGATIVE for fever, chills, change in weight  I:       NEGATIVE for worrisome rashes, moles or lesions  E:     NEGATIVE for vision changes or irritation  E/M: NEGATIVE for ear, mouth and throat problems  R:     NEGATIVE for significant cough or SOB  CV:   NEGATIVE for chest pain, palpitations or peripheral edema  GI:     NEGATIVE for nausea, abdominal pain, heartburn, or change in bowel habits  :   NEGATIVE for frequency, dysuria, hematuria, vaginal discharge, or irregular bleeding  M:     NEGATIVE for significant arthralgias or myalgia  N:      NEGATIVE for weakness, dizziness or paresthesias  E:      NEGATIVE for temperature intolerance, skin/hair changes  P:      NEGATIVE for changes in mood or affect.    EXAM:  /77 (BP Location: Left arm,  Patient Position: Sitting, Cuff Size: Adult Regular)   Pulse 89   Temp 97.7  F (36.5  C) (Oral)   Wt 58.1 kg (128 lb)   LMP 09/02/2019   BMI 22.04 kg/m     BMI: Body mass index is 22.04 kg/m .  Constitutional: healthy, alert and no distress  Head: Normocephalic. No masses, lesions, tenderness or abnormalities  Neck: Neck supple. Trachea midline. No adenopathy. Thyroid symmetric, normal size.   Cardiovascular: RRR.   Respiratory: Negative.   Breast: No nodularity, asymmetry or nipple discharge bilaterally.  Gastrointestinal: Abdomen soft, non-tender, non-distended. No masses, organomegaly.  :  Vulva:  No external lesions, normal female hair distribution, no inguinal adenopathy.    Urethra:  Midline, non-tender, well supported, no discharge  Vagina:  Moist, pink, no abnormal discharge, no lesions  Uterus:  Normal size, anteverted, non-tender, freely mobile  Ovaries:  No masses appreciated, non-tender, mobile  Rectal Exam: deferred  Musculoskeletal: extremities normal  Skin: no suspicious lesions or rashes  Psychiatric: Affect appropriate, cooperative,mentation appears normal.     COUNSELING:   Reviewed preventive health counseling, as reflected in patient instructions       Contraception       Family planning   reports that she has never smoked. She has never used smokeless tobacco.    Body mass index is 22.04 kg/m .    FRAX Risk Assessment    ASSESSMENT:  34 year old female with satisfactory annual exam  (Z00.00) Annual physical exam  (primary encounter diagnosis)  Comment: Normal exam today.  No contraindications to ANH, so refill of oral contractive pills provided.  Pap updated.  Plan: norethindrone-ethinyl estradiol-iron (LOESTRIN         FE 1.5/30) 1.5-30 MG-MCG tablet, Pap imaged         thin layer screen with HPV - recommended age 30        - 65 years (select HPV order below), HPV High         Risk Types DNA Cervical          Yu Bruner MD

## 2019-09-26 LAB
COPATH REPORT: NORMAL
PAP: NORMAL

## 2019-09-27 LAB
FINAL DIAGNOSIS: NORMAL
HPV HR 12 DNA CVX QL NAA+PROBE: NEGATIVE
HPV16 DNA SPEC QL NAA+PROBE: NEGATIVE
HPV18 DNA SPEC QL NAA+PROBE: NEGATIVE
SPECIMEN DESCRIPTION: NORMAL
SPECIMEN SOURCE CVX/VAG CYTO: NORMAL

## 2019-11-12 ENCOUNTER — OFFICE VISIT (OUTPATIENT)
Dept: MIDWIFE SERVICES | Facility: CLINIC | Age: 34
End: 2019-11-12

## 2019-11-12 VITALS
BODY MASS INDEX: 21.87 KG/M2 | WEIGHT: 127 LBS | TEMPERATURE: 98.1 F | SYSTOLIC BLOOD PRESSURE: 115 MMHG | DIASTOLIC BLOOD PRESSURE: 73 MMHG | HEART RATE: 82 BPM

## 2019-11-12 DIAGNOSIS — Z30.430 ENCOUNTER FOR IUD INSERTION: ICD-10-CM

## 2019-11-12 DIAGNOSIS — Z97.5 IUD (INTRAUTERINE DEVICE) IN PLACE: Primary | ICD-10-CM

## 2019-11-12 LAB — HCG UR QL: NEGATIVE

## 2019-11-12 PROCEDURE — 81025 URINE PREGNANCY TEST: CPT | Performed by: ADVANCED PRACTICE MIDWIFE

## 2019-11-12 PROCEDURE — 58300 INSERT INTRAUTERINE DEVICE: CPT | Performed by: ADVANCED PRACTICE MIDWIFE

## 2019-11-12 RX ORDER — COPPER 313.4 MG/1
1 INTRAUTERINE DEVICE INTRAUTERINE ONCE
Qty: 1 EACH | Refills: 0
Start: 2019-11-12 | End: 2019-11-12

## 2019-11-12 NOTE — NURSING NOTE
"Chief Complaint   Patient presents with     IUD     PLACEMENT   PLACEMENT Godfrey NDC:[id number] LOT:[id number] EXP:2025    Initial /73 (BP Location: Left arm, Patient Position: Sitting, Cuff Size: Adult Regular)   Pulse 82   Temp 98.1  F (36.7  C) (Oral)   Wt 57.6 kg (127 lb)   BMI 21.87 kg/m   Estimated body mass index is 21.87 kg/m  as calculated from the following:    Height as of 10/30/17: 1.623 m (5' 3.9\").    Weight as of this encounter: 57.6 kg (127 lb).  BP completed using cuff size: regular    Questioned patient about current smoking habits.  Pt. has never smoked.          The following HM Due: NONE      The following patient reported/Care Every where data was sent to:  P ABSTRACT QUALITY INITIATIVES [[id number]]  NATALIE Galvez MA           "

## 2019-11-12 NOTE — PROGRESS NOTES
Chief Complaint/ History of Present Illness:  Denia Raymond is a 34 year old  female.   LMP;  11/5/19  Today's pregnancy test negative.  She is here for an IUD insertion.  Patient has been given written information.  I have reviewed the risks of the IUD including pregnancy, PID, life threatening infection, perforation, expulsion, cramping, changes in bleeding and ovarian cysts. Benefits of the IUD and alternative family planning methods have been discussed.  Patients questions are answered.  Patient has verbalized understanding of risks and benefits and has signed the consent form.  Allergies   Allergen Reactions     Penicillins Rash     rash     Current Outpatient Medications   Medication Sig Dispense Refill     norethindrone-ethinyl estradiol-iron (LOESTRIN FE 1.5/30) 1.5-30 MG-MCG tablet Take 1 tablet by mouth daily 84 tablet 3     order for DME Knee brace 1 each 0     order for DME crutches 1 each 0      No past medical history on file.  Past Surgical History:   Procedure Laterality Date     No Surgical history       REVIEW OF SYSTEMS  CONSTITUTIONAL: Denies fever, chills and night sweats  BREASTS:  Denies discharge and lumps.    HEART/LUNGS: Denies dyspnea, wheezing, coughing and chest pain.  HEMATOLOGIC: Denies tendency to bruise and history of blood clots.  GENITOURINARY:  Denies urgency, dysuria and hematuria.  NEUROLOGIC:  Denies seizures, weakness and fainting.  PSYCHIATRIC: Negative for changes in mood or affect    EXAM:  /73 (BP Location: Left arm, Patient Position: Sitting, Cuff Size: Adult Regular)   Pulse 82   Temp 98.1  F (36.7  C) (Oral)   Wt 57.6 kg (127 lb)   BMI 21.87 kg/m    Abdomen: soft, nontender, without hepatosplenomegaly or masses  : normal cervix, adnexae, and uterus without masses or discharge  IUD type: Paragard T-380  Lot # [id number]    PARAGARD: NDC# [id number]  Procedure:  Uterus assessed for position and is Retroverted.  Speculum inserted.  Betadine prep of  cervix done.  Tenaculum applied at 12 o'clock position and gentle traction was applied to elongate the cervical canal.  Uterus sounded to 7 1/2 cm's.  Cervical dilators not used .   IUD inserted in the usual fashion without problems, ie: resistance, severe protracted pain or excessive bleeding.  Tenaculum was removed with scant bleeding from the tenaculum site that was managed with some direct pressure using Estrada swabs.  Strings trimmed to 2 cm's.  Patient tolerated the procedure very  well without any prolonged pain or syncopy.    ASSESSMENT/ PLAN:  (Z30.9) Contraceptive management  (primary encounter diagnosis)  Comment:   Plan: HCG Qual, Urine ([id number])        Negative     GC/Chlamydia Screening:  PATIENT DECLINED    Instructions given to patient regarding checking IUD strings, returning to the clinic if pain or inability to check strings and/or irregular bleeding.  Pt to RTC in as needed.    Nikole Manuel CNM

## 2019-12-10 ENCOUNTER — OFFICE VISIT (OUTPATIENT)
Dept: FAMILY MEDICINE | Facility: CLINIC | Age: 34
End: 2019-12-10
Payer: COMMERCIAL

## 2019-12-10 VITALS
HEIGHT: 64 IN | RESPIRATION RATE: 16 BRPM | TEMPERATURE: 97.9 F | HEART RATE: 82 BPM | OXYGEN SATURATION: 97 % | WEIGHT: 121.6 LBS | BODY MASS INDEX: 20.76 KG/M2 | DIASTOLIC BLOOD PRESSURE: 88 MMHG | SYSTOLIC BLOOD PRESSURE: 122 MMHG

## 2019-12-10 DIAGNOSIS — F41.1 GAD (GENERALIZED ANXIETY DISORDER): Primary | ICD-10-CM

## 2019-12-10 PROCEDURE — 96127 BRIEF EMOTIONAL/BEHAV ASSMT: CPT | Mod: 59 | Performed by: PHYSICIAN ASSISTANT

## 2019-12-10 PROCEDURE — 99214 OFFICE O/P EST MOD 30 MIN: CPT | Performed by: PHYSICIAN ASSISTANT

## 2019-12-10 RX ORDER — ESCITALOPRAM OXALATE 10 MG/1
10 TABLET ORAL DAILY
Qty: 30 TABLET | Refills: 1 | Status: SHIPPED | OUTPATIENT
Start: 2019-12-10 | End: 2020-01-07 | Stop reason: SINTOL

## 2019-12-10 ASSESSMENT — ANXIETY QUESTIONNAIRES
5. BEING SO RESTLESS THAT IT IS HARD TO SIT STILL: MORE THAN HALF THE DAYS
IF YOU CHECKED OFF ANY PROBLEMS ON THIS QUESTIONNAIRE, HOW DIFFICULT HAVE THESE PROBLEMS MADE IT FOR YOU TO DO YOUR WORK, TAKE CARE OF THINGS AT HOME, OR GET ALONG WITH OTHER PEOPLE: EXTREMELY DIFFICULT
3. WORRYING TOO MUCH ABOUT DIFFERENT THINGS: NEARLY EVERY DAY
7. FEELING AFRAID AS IF SOMETHING AWFUL MIGHT HAPPEN: MORE THAN HALF THE DAYS
6. BECOMING EASILY ANNOYED OR IRRITABLE: MORE THAN HALF THE DAYS
1. FEELING NERVOUS, ANXIOUS, OR ON EDGE: NEARLY EVERY DAY
2. NOT BEING ABLE TO STOP OR CONTROL WORRYING: NEARLY EVERY DAY
GAD7 TOTAL SCORE: 18

## 2019-12-10 ASSESSMENT — PATIENT HEALTH QUESTIONNAIRE - PHQ9
SUM OF ALL RESPONSES TO PHQ QUESTIONS 1-9: 15
5. POOR APPETITE OR OVEREATING: NEARLY EVERY DAY

## 2019-12-10 ASSESSMENT — MIFFLIN-ST. JEOR: SCORE: 1223.63

## 2019-12-10 NOTE — PROGRESS NOTES
Subjective     Denia Raymond is a 34 year old female who presents to clinic today for the following health issues:    HPI   Anxiety     How are you doing with your anxiety since your last visit? On a scale 8/10 feeling anxious     Are you having other symptoms that might be associated with anxiety? Yes:  focus, disoriented, appetite issues, nausea, diarrhea    Have you had a significant life event? Job Concerns new promotion a month ago, patient just moved and, just got out of relationship      Are you feeling depressed? Yes:  unmotivated    Do you have any concerns with your use of alcohol or other drugs? No    Social History     Tobacco Use     Smoking status: Former Smoker     Packs/day: 0.00     Years: 5.00     Pack years: 0.00     Types: Cigarettes     Smokeless tobacco: Never Used   Substance Use Topics     Alcohol use: Yes     Comment: ocasional     Drug use: No     MIGUEL-7 SCORE 12/10/2019   Total Score 18     PHQ 12/10/2019   PHQ-9 Total Score 15   Q9: Thoughts of better off dead/self-harm past 2 weeks Not at all           How many servings of fruits and vegetables do you eat daily?  2-3    On average, how many sweetened beverages do you drink each day (Examples: soda, juice, sweet tea, etc.  Do NOT count diet or artificially sweetened beverages)?   0    How many days per week do you miss taking your medication? 0    33 y/o new to me female here to discuss an increase in her anxiety.  Admits she has always had some as just part of her personality.  Always been able to keep it in check and has not affected her.  Over the last few months, she has had many external stressors that she is struggling to deal with.  The main seems to be a job promotion.  She has never had to take medication or really seek any treatment for her anxiety.  Not sleeping well.  Anxiety does run in her family, mom has had for years.  Currently she does quite will on lexapro and buspar.     BP Readings from Last 3 Encounters:   12/10/19  "122/88   11/12/19 115/73   09/23/19 115/77    Wt Readings from Last 3 Encounters:   12/10/19 55.2 kg (121 lb 9.6 oz)   11/12/19 57.6 kg (127 lb)   09/23/19 58.1 kg (128 lb)                    Reviewed and updated as needed this visit by Provider         Review of Systems   ROS COMP: Constitutional, HEENT, cardiovascular, pulmonary, gi and gu systems are negative, except as otherwise noted.      Objective    /88   Pulse 82   Temp 97.9  F (36.6  C) (Oral)   Resp 16   Ht 1.613 m (5' 3.5\")   Wt 55.2 kg (121 lb 9.6 oz)   SpO2 97%   BMI 21.20 kg/m    Body mass index is 21.2 kg/m .  Physical Exam   GENERAL: alert and no distress  EYES: Eyes grossly normal to inspection  HENT: ear canals and TM's normal, nose and mouth without ulcers or lesions  RESP: lungs clear to auscultation - no rales, rhonchi or wheezes  CV: regular rate and rhythm, normal S1 S2, no S3 or S4, no murmur, click or rub, no peripheral edema and peripheral pulses strong  PSYCH: mentation appears normal, affect normal/bright    Diagnostic Test Results:  Labs reviewed in Epic        Assessment & Plan     1. MIGUEL (generalized anxiety disorder)  Discussed the benefits and potential side effects of medication including making things worse with several medications. Since mom has done well with lexapro, will start with that.  She is in agreement.  Offered referral for counseling, she did decline for now.  Discussed dietary and lifestyle changes that also may play a role in improving anxiety.  Would like her to follow up in 1 week with mychart or phone call.  - escitalopram (LEXAPRO) 10 MG tablet; Take 1 tablet (10 mg) by mouth daily  Dispense: 30 tablet; Refill: 1       Time of visit was 25 minutes with >50% time spend face to face with education and counseling.    Return in about 1 week (around 12/17/2019) for e-visit/telephone.    Avinash Dyer PA-C  [facility]      "

## 2019-12-11 ASSESSMENT — ANXIETY QUESTIONNAIRES: GAD7 TOTAL SCORE: 18

## 2019-12-11 NOTE — NURSING NOTE
"Chief Complaint   Patient presents with     Anxiety     /88   Pulse 82   Temp 97.9  F (36.6  C) (Oral)   Resp 16   Ht 1.613 m (5' 3.5\")   Wt 55.2 kg (121 lb 9.6 oz)   SpO2 97%   BMI 21.20 kg/m   Estimated body mass index is 21.2 kg/m  as calculated from the following:    Height as of this encounter: 1.613 m (5' 3.5\").    Weight as of this encounter: 55.2 kg (121 lb 9.6 oz).  bp completed using cuff size: regular      Health Maintenance addressed:  GAD7    Possibly completing today per provider review.    Latisha Aponte MA    "

## 2019-12-11 NOTE — PATIENT INSTRUCTIONS
Please start over the counter magnesium glycinate 200-400 mg at night on empty stomach.    Please start over the counter folate in the am.  400 mcg    Will start lexaprol 10 mg in the am.    Please mychart me an update in 7 days or sooner with problems.

## 2020-01-07 ENCOUNTER — MYC MEDICAL ADVICE (OUTPATIENT)
Dept: FAMILY MEDICINE | Facility: CLINIC | Age: 35
End: 2020-01-07

## 2020-01-07 ENCOUNTER — E-VISIT (OUTPATIENT)
Dept: FAMILY MEDICINE | Facility: CLINIC | Age: 35
End: 2020-01-07
Payer: COMMERCIAL

## 2020-01-07 DIAGNOSIS — F41.1 GAD (GENERALIZED ANXIETY DISORDER): Primary | ICD-10-CM

## 2020-01-07 DIAGNOSIS — F41.1 GAD (GENERALIZED ANXIETY DISORDER): ICD-10-CM

## 2020-01-07 PROCEDURE — 99421 OL DIG E/M SVC 5-10 MIN: CPT | Performed by: PHYSICIAN ASSISTANT

## 2020-01-07 RX ORDER — SERTRALINE HYDROCHLORIDE 25 MG/1
25 TABLET, FILM COATED ORAL DAILY
Qty: 30 TABLET | Refills: 0 | Status: SHIPPED | OUTPATIENT
Start: 2020-01-07 | End: 2020-01-31

## 2020-01-07 ASSESSMENT — ANXIETY QUESTIONNAIRES
GAD7 TOTAL SCORE: 16
7. FEELING AFRAID AS IF SOMETHING AWFUL MIGHT HAPPEN: SEVERAL DAYS
5. BEING SO RESTLESS THAT IT IS HARD TO SIT STILL: NEARLY EVERY DAY
3. WORRYING TOO MUCH ABOUT DIFFERENT THINGS: NEARLY EVERY DAY
4. TROUBLE RELAXING: MORE THAN HALF THE DAYS
2. NOT BEING ABLE TO STOP OR CONTROL WORRYING: NEARLY EVERY DAY
GAD7 TOTAL SCORE: 16
6. BECOMING EASILY ANNOYED OR IRRITABLE: MORE THAN HALF THE DAYS
1. FEELING NERVOUS, ANXIOUS, OR ON EDGE: MORE THAN HALF THE DAYS
7. FEELING AFRAID AS IF SOMETHING AWFUL MIGHT HAPPEN: SEVERAL DAYS
GAD7 TOTAL SCORE: 16

## 2020-01-07 ASSESSMENT — PATIENT HEALTH QUESTIONNAIRE - PHQ9
SUM OF ALL RESPONSES TO PHQ QUESTIONS 1-9: 17
10. IF YOU CHECKED OFF ANY PROBLEMS, HOW DIFFICULT HAVE THESE PROBLEMS MADE IT FOR YOU TO DO YOUR WORK, TAKE CARE OF THINGS AT HOME, OR GET ALONG WITH OTHER PEOPLE: VERY DIFFICULT
SUM OF ALL RESPONSES TO PHQ QUESTIONS 1-9: 17

## 2020-01-07 NOTE — TELEPHONE ENCOUNTER
Note from 12/10/19 OV:  Return in about 1 week (around 12/17/2019) for e-visit/telephone.    Message sent to patient asking her to send as casimiro Garg RN

## 2020-01-08 ASSESSMENT — PATIENT HEALTH QUESTIONNAIRE - PHQ9: SUM OF ALL RESPONSES TO PHQ QUESTIONS 1-9: 17

## 2020-01-08 ASSESSMENT — ANXIETY QUESTIONNAIRES: GAD7 TOTAL SCORE: 16

## 2020-01-30 ENCOUNTER — TRANSFERRED RECORDS (OUTPATIENT)
Dept: HEALTH INFORMATION MANAGEMENT | Facility: CLINIC | Age: 35
End: 2020-01-30

## 2020-02-12 ENCOUNTER — MYC MEDICAL ADVICE (OUTPATIENT)
Dept: FAMILY MEDICINE | Facility: CLINIC | Age: 35
End: 2020-02-12

## 2020-02-12 DIAGNOSIS — F41.1 GAD (GENERALIZED ANXIETY DISORDER): Primary | ICD-10-CM

## 2020-02-12 NOTE — TELEPHONE ENCOUNTER
JS  Please see MyChart  Pended rx to replace damaged pills. Ok to do this?    Thank you,  Eden Martin RN

## 2020-03-02 ENCOUNTER — TRANSFERRED RECORDS (OUTPATIENT)
Dept: HEALTH INFORMATION MANAGEMENT | Facility: CLINIC | Age: 35
End: 2020-03-02

## 2020-03-10 ENCOUNTER — HEALTH MAINTENANCE LETTER (OUTPATIENT)
Age: 35
End: 2020-03-10

## 2020-03-25 ENCOUNTER — MYC MEDICAL ADVICE (OUTPATIENT)
Dept: FAMILY MEDICINE | Facility: CLINIC | Age: 35
End: 2020-03-25

## 2020-04-02 ENCOUNTER — TRANSFERRED RECORDS (OUTPATIENT)
Dept: HEALTH INFORMATION MANAGEMENT | Facility: CLINIC | Age: 35
End: 2020-04-02

## 2020-04-08 ENCOUNTER — E-VISIT (OUTPATIENT)
Dept: FAMILY MEDICINE | Facility: CLINIC | Age: 35
End: 2020-04-08
Payer: COMMERCIAL

## 2020-04-08 DIAGNOSIS — F10.20 UNCOMPLICATED ALCOHOL DEPENDENCE (H): Primary | ICD-10-CM

## 2020-04-08 PROCEDURE — 99422 OL DIG E/M SVC 11-20 MIN: CPT | Performed by: PHYSICIAN ASSISTANT

## 2020-04-09 RX ORDER — NALTREXONE HYDROCHLORIDE 50 MG/1
50 TABLET, FILM COATED ORAL DAILY
Qty: 30 TABLET | Refills: 1 | Status: SHIPPED | OUTPATIENT
Start: 2020-04-09 | End: 2020-06-11

## 2020-05-04 ENCOUNTER — TRANSFERRED RECORDS (OUTPATIENT)
Dept: HEALTH INFORMATION MANAGEMENT | Facility: CLINIC | Age: 35
End: 2020-05-04

## 2020-05-12 DIAGNOSIS — F41.1 GAD (GENERALIZED ANXIETY DISORDER): ICD-10-CM

## 2020-05-12 NOTE — TELEPHONE ENCOUNTER
JF,   Please advise in JS' absence  Routing refill request to provider for review/approval because:  PHQ9 above goal of 4  Lenka DOMÍNGUEZ RN

## 2020-06-04 ENCOUNTER — TRANSFERRED RECORDS (OUTPATIENT)
Dept: HEALTH INFORMATION MANAGEMENT | Facility: CLINIC | Age: 35
End: 2020-06-04

## 2020-06-11 ENCOUNTER — MYC MEDICAL ADVICE (OUTPATIENT)
Dept: FAMILY MEDICINE | Facility: CLINIC | Age: 35
End: 2020-06-11

## 2020-06-11 ENCOUNTER — VIRTUAL VISIT (OUTPATIENT)
Dept: FAMILY MEDICINE | Facility: CLINIC | Age: 35
End: 2020-06-11
Payer: COMMERCIAL

## 2020-06-11 DIAGNOSIS — F10.20 UNCOMPLICATED ALCOHOL DEPENDENCE (H): Primary | ICD-10-CM

## 2020-06-11 PROCEDURE — 99213 OFFICE O/P EST LOW 20 MIN: CPT | Mod: TEL | Performed by: PHYSICIAN ASSISTANT

## 2020-06-11 RX ORDER — NALTREXONE HYDROCHLORIDE 50 MG/1
50 TABLET, FILM COATED ORAL DAILY
Qty: 30 TABLET | Refills: 3 | Status: SHIPPED | OUTPATIENT
Start: 2020-06-11 | End: 2021-10-18

## 2020-06-11 NOTE — PROGRESS NOTES
"Denia Raymond is a 35 year old female who is being evaluated via a billable telephone visit.      The patient has been notified of following:     \"This telephone visit will be conducted via a call between you and your physician/provider. We have found that certain health care needs can be provided without the need for a physical exam.  This service lets us provide the care you need with a short phone conversation.  If a prescription is necessary we can send it directly to your pharmacy.  If lab work is needed we can place an order for that and you can then stop by our lab to have the test done at a later time.    Telephone visits are billed at different rates depending on your insurance coverage. During this emergency period, for some insurers they may be billed the same as an in-person visit.  Please reach out to your insurance provider with any questions.    If during the course of the call the physician/provider feels a telephone visit is not appropriate, you will not be charged for this service.\"    Patient has given verbal consent for Telephone visit?  Yes    What phone number would you like to be contacted at? [phone]    How would you like to obtain your AVS? MyChart    Subjective     Denia Raymond is a 35 year old female who presents via phone visit today for the following health issues:    HPI  Patient would like to dicuss FMLA forms uploaded via [employer].    March 18 Aug 3    Outpatient program, in week 5 of 10.      Denia has been working on alcohol dependence since March of this year.  She has transitioned to out patient program.  She is very happy with [employer], learning lots of new skills and coping mechanisms.  We did start naltrexone 2 months ago, and she does feel that it helps.  No real side effects.  She is currently in week 5 of a potential 10 to 12 week program.  She was recommended to get some short term disability.               BP Readings from Last 3 Encounters:   12/10/19 122/88 "   11/12/19 115/73   09/23/19 115/77    Wt Readings from Last 3 Encounters:   12/10/19 55.2 kg (121 lb 9.6 oz)   11/12/19 57.6 kg (127 lb)   09/23/19 58.1 kg (128 lb)                    Reviewed and updated as needed this visit by Provider         Review of Systems   Constitutional, HEENT, cardiovascular, pulmonary, gi and gu systems are negative, except as otherwise noted.       Objective   Reported vitals:  There were no vitals taken for this visit.   alert and no distress  PSYCH: Alert and oriented times 3; coherent speech, normal   rate and volume, able to articulate logical thoughts, able   to abstract reason, no tangential thoughts, no hallucinations   or delusions  Her affect is normal  RESP: No cough, no audible wheezing, able to talk in full sentences  Remainder of exam unable to be completed due to telephone visits    Diagnostic Test Results:  Labs reviewed in Epic        Assessment/Plan:  1. Uncomplicated alcohol dependence (H)  Doing well.  Will continue naltrexone, and will fill out paper work for her.  May adjust depending on how she progresses.    - naltrexone (DEPADE/REVIA) 50 MG tablet; Take 1 tablet (50 mg) by mouth daily  Dispense: 30 tablet; Refill: 3    No follow-ups on file.      Phone call duration:  8 minutes    Avinash Dyer PA-C

## 2020-06-12 NOTE — PROGRESS NOTES
Faxed and sent to scanning  Placed copy  in blue folder  Nae Muñoz [facility] on 6/12/2020 at 12:03 PM

## 2020-07-20 ENCOUNTER — TRANSFERRED RECORDS (OUTPATIENT)
Dept: HEALTH INFORMATION MANAGEMENT | Facility: CLINIC | Age: 35
End: 2020-07-20

## 2020-10-26 DIAGNOSIS — F41.1 GAD (GENERALIZED ANXIETY DISORDER): ICD-10-CM

## 2020-12-27 ENCOUNTER — HEALTH MAINTENANCE LETTER (OUTPATIENT)
Age: 35
End: 2020-12-27

## 2021-10-09 ENCOUNTER — HEALTH MAINTENANCE LETTER (OUTPATIENT)
Age: 36
End: 2021-10-09

## 2021-10-18 ENCOUNTER — VIRTUAL VISIT (OUTPATIENT)
Dept: FAMILY MEDICINE | Facility: CLINIC | Age: 36
End: 2021-10-18

## 2021-10-18 DIAGNOSIS — F41.1 GAD (GENERALIZED ANXIETY DISORDER): Primary | ICD-10-CM

## 2021-10-18 DIAGNOSIS — F10.20 UNCOMPLICATED ALCOHOL DEPENDENCE (H): ICD-10-CM

## 2021-10-18 PROCEDURE — 96127 BRIEF EMOTIONAL/BEHAV ASSMT: CPT | Mod: 95 | Performed by: PHYSICIAN ASSISTANT

## 2021-10-18 PROCEDURE — 99213 OFFICE O/P EST LOW 20 MIN: CPT | Mod: 95 | Performed by: PHYSICIAN ASSISTANT

## 2021-10-18 RX ORDER — SERTRALINE HYDROCHLORIDE 100 MG/1
100 TABLET, FILM COATED ORAL DAILY
COMMUNITY
End: 2022-11-08

## 2021-10-18 RX ORDER — SERTRALINE HYDROCHLORIDE 100 MG/1
100 TABLET, FILM COATED ORAL DAILY
Qty: 90 TABLET | Refills: 1 | Status: SHIPPED | OUTPATIENT
Start: 2021-10-18 | End: 2022-08-15

## 2021-10-18 ASSESSMENT — PATIENT HEALTH QUESTIONNAIRE - PHQ9
SUM OF ALL RESPONSES TO PHQ QUESTIONS 1-9: 2
SUM OF ALL RESPONSES TO PHQ QUESTIONS 1-9: 2
10. IF YOU CHECKED OFF ANY PROBLEMS, HOW DIFFICULT HAVE THESE PROBLEMS MADE IT FOR YOU TO DO YOUR WORK, TAKE CARE OF THINGS AT HOME, OR GET ALONG WITH OTHER PEOPLE: NOT DIFFICULT AT ALL

## 2021-10-18 ASSESSMENT — ANXIETY QUESTIONNAIRES
GAD7 TOTAL SCORE: 3
2. NOT BEING ABLE TO STOP OR CONTROL WORRYING: NOT AT ALL
8. IF YOU CHECKED OFF ANY PROBLEMS, HOW DIFFICULT HAVE THESE MADE IT FOR YOU TO DO YOUR WORK, TAKE CARE OF THINGS AT HOME, OR GET ALONG WITH OTHER PEOPLE?: NOT DIFFICULT AT ALL
GAD7 TOTAL SCORE: 3
6. BECOMING EASILY ANNOYED OR IRRITABLE: NOT AT ALL
1. FEELING NERVOUS, ANXIOUS, OR ON EDGE: SEVERAL DAYS
4. TROUBLE RELAXING: SEVERAL DAYS
7. FEELING AFRAID AS IF SOMETHING AWFUL MIGHT HAPPEN: NOT AT ALL
GAD7 TOTAL SCORE: 3
3. WORRYING TOO MUCH ABOUT DIFFERENT THINGS: NOT AT ALL
7. FEELING AFRAID AS IF SOMETHING AWFUL MIGHT HAPPEN: NOT AT ALL
5. BEING SO RESTLESS THAT IT IS HARD TO SIT STILL: SEVERAL DAYS

## 2021-10-18 NOTE — PROGRESS NOTES
Answers for HPI/ROS submitted by the patient on 10/18/2021  If you checked off any problems, how difficult have these problems made it for you to do your work, take care of things at home, or get along with other people?: Not difficult at all  PHQ9 TOTAL SCORE: 2    Denia is a 36 year old who is being evaluated via a billable video visit.      How would you like to obtain your AVS? MyChart  If the video visit is dropped, the invitation should be resent by: Text to cell phone: [phone]  Will anyone else be joining your video visit? No    Video Start Time: 5:34 PM    Assessment & Plan     MIGUEL (generalized anxiety disorder)  Doing well  - sertraline (ZOLOFT) 100 MG tablet; Take 1 tablet (100 mg) by mouth daily    Uncomplicated alcohol dependence (H)  Sober last 10 months, feeling confident in recovery                   No follow-ups on file.    Avniash Dyer PA-C  [facility]   Denia is a 36 year old who presents for the following health issues     HPI     New Patient/Transfer of Care  Depression and Anxiety Follow-Up    How are you doing with your depression since your last visit? Improved going well     How are you doing with your anxiety since your last visit?  Improved going well     Are you having other symptoms that might be associated with depression or anxiety? No    Have you had a significant life event? OTHER: moved to [place] from [place]      Do you have any concerns with your use of alcohol or other drugs? No    Social History     Tobacco Use     Smoking status: Former Smoker     Packs/day: 0.00     Years: 5.00     Pack years: 0.00     Types: Cigarettes     Smokeless tobacco: Never Used   Substance Use Topics     Alcohol use: Yes     Comment: ocasional     Drug use: Never     PHQ 12/10/2019 1/7/2020 10/18/2021   PHQ-9 Total Score 15 17 2   Q9: Thoughts of better off dead/self-harm past 2 weeks Not at all Not at all Not at all     MIGUEL-7 SCORE 12/10/2019 1/7/2020  10/18/2021   Total Score - 16 (severe anxiety) 3 (minimal anxiety)   Total Score 18 16 3         Suicide Assessment Five-step Evaluation and Treatment (SAFE-T)      How many servings of fruits and vegetables do you eat daily?  2-3    On average, how many sweetened beverages do you drink each day (Examples: soda, juice, sweet tea, etc.  Do NOT count diet or artificially sweetened beverages)?   0    How many days per week do you exercise enough to make your heart beat faster? 7    How many minutes a day do you exercise enough to make your heart beat faster? 30 - 60    How many days per week do you miss taking your medication? 0    She did go back into rehab end of 2020 in [place], and has done great since.  10 months sober.  Adjusted zoloft to 100 mg and working well without side effects.  Working remote full time.    Review of Systems   Constitutional, HEENT, cardiovascular, pulmonary, gi and gu systems are negative, except as otherwise noted.      Objective           Vitals:  No vitals were obtained today due to virtual visit.    Physical Exam   No exam, telephone visit              Changed to telephone, patient could not get connection.  Time of call 7 minutes.

## 2021-10-19 ASSESSMENT — PATIENT HEALTH QUESTIONNAIRE - PHQ9: SUM OF ALL RESPONSES TO PHQ QUESTIONS 1-9: 2

## 2021-10-19 ASSESSMENT — ANXIETY QUESTIONNAIRES: GAD7 TOTAL SCORE: 3

## 2022-01-01 NOTE — PATIENT INSTRUCTIONS
Xray knee   Brace and crutches , toe touch weight bearing until see specialist   See physical therapy and orthopedics for further evaluation and management  Go to the ER if worse  Return to primary of choice for physical / pap etc  Recommend flu shot yearly   Due also for Tdap  Sign a release so we can update records if planning to continue care here   
2022 22:27

## 2022-01-29 ENCOUNTER — HEALTH MAINTENANCE LETTER (OUTPATIENT)
Age: 37
End: 2022-01-29

## 2022-09-11 ENCOUNTER — HEALTH MAINTENANCE LETTER (OUTPATIENT)
Age: 37
End: 2022-09-11

## 2022-11-05 ASSESSMENT — ANXIETY QUESTIONNAIRES
IF YOU CHECKED OFF ANY PROBLEMS ON THIS QUESTIONNAIRE, HOW DIFFICULT HAVE THESE PROBLEMS MADE IT FOR YOU TO DO YOUR WORK, TAKE CARE OF THINGS AT HOME, OR GET ALONG WITH OTHER PEOPLE: VERY DIFFICULT
6. BECOMING EASILY ANNOYED OR IRRITABLE: MORE THAN HALF THE DAYS
4. TROUBLE RELAXING: NOT AT ALL
GAD7 TOTAL SCORE: 6
7. FEELING AFRAID AS IF SOMETHING AWFUL MIGHT HAPPEN: NOT AT ALL
7. FEELING AFRAID AS IF SOMETHING AWFUL MIGHT HAPPEN: NOT AT ALL
8. IF YOU CHECKED OFF ANY PROBLEMS, HOW DIFFICULT HAVE THESE MADE IT FOR YOU TO DO YOUR WORK, TAKE CARE OF THINGS AT HOME, OR GET ALONG WITH OTHER PEOPLE?: VERY DIFFICULT
1. FEELING NERVOUS, ANXIOUS, OR ON EDGE: MORE THAN HALF THE DAYS
2. NOT BEING ABLE TO STOP OR CONTROL WORRYING: SEVERAL DAYS
GAD7 TOTAL SCORE: 6
GAD7 TOTAL SCORE: 6
3. WORRYING TOO MUCH ABOUT DIFFERENT THINGS: SEVERAL DAYS
5. BEING SO RESTLESS THAT IT IS HARD TO SIT STILL: NOT AT ALL

## 2022-11-05 ASSESSMENT — PATIENT HEALTH QUESTIONNAIRE - PHQ9
10. IF YOU CHECKED OFF ANY PROBLEMS, HOW DIFFICULT HAVE THESE PROBLEMS MADE IT FOR YOU TO DO YOUR WORK, TAKE CARE OF THINGS AT HOME, OR GET ALONG WITH OTHER PEOPLE: VERY DIFFICULT
SUM OF ALL RESPONSES TO PHQ QUESTIONS 1-9: 14
SUM OF ALL RESPONSES TO PHQ QUESTIONS 1-9: 14

## 2022-11-08 ENCOUNTER — VIRTUAL VISIT (OUTPATIENT)
Dept: FAMILY MEDICINE | Facility: CLINIC | Age: 37
End: 2022-11-08

## 2022-11-08 DIAGNOSIS — F10.20 UNCOMPLICATED ALCOHOL DEPENDENCE (H): ICD-10-CM

## 2022-11-08 DIAGNOSIS — F41.1 GAD (GENERALIZED ANXIETY DISORDER): Primary | ICD-10-CM

## 2022-11-08 PROCEDURE — 99213 OFFICE O/P EST LOW 20 MIN: CPT | Mod: 95 | Performed by: PHYSICIAN ASSISTANT

## 2022-11-08 RX ORDER — HYDROXYZINE HYDROCHLORIDE 25 MG/1
25 TABLET, FILM COATED ORAL 3 TIMES DAILY PRN
Qty: 30 TABLET | Refills: 0 | Status: SHIPPED | OUTPATIENT
Start: 2022-11-08

## 2022-11-08 RX ORDER — SERTRALINE HYDROCHLORIDE 100 MG/1
100 TABLET, FILM COATED ORAL DAILY
Qty: 90 TABLET | Refills: 3 | Status: SHIPPED | OUTPATIENT
Start: 2022-11-08 | End: 2023-10-30

## 2022-11-08 RX ORDER — NALTREXONE HYDROCHLORIDE 50 MG/1
50 TABLET, FILM COATED ORAL DAILY
Qty: 90 TABLET | Refills: 3 | Status: SHIPPED | OUTPATIENT
Start: 2022-11-08 | End: 2023-10-30

## 2022-11-08 ASSESSMENT — ANXIETY QUESTIONNAIRES: GAD7 TOTAL SCORE: 6

## 2022-11-08 ASSESSMENT — PATIENT HEALTH QUESTIONNAIRE - PHQ9
10. IF YOU CHECKED OFF ANY PROBLEMS, HOW DIFFICULT HAVE THESE PROBLEMS MADE IT FOR YOU TO DO YOUR WORK, TAKE CARE OF THINGS AT HOME, OR GET ALONG WITH OTHER PEOPLE: VERY DIFFICULT
SUM OF ALL RESPONSES TO PHQ QUESTIONS 1-9: 14

## 2022-11-08 NOTE — PROGRESS NOTES
Denia is a 37 year old who is being evaluated via a billable video visit.      How would you like to obtain your AVS? MyChart  If the video visit is dropped, the invitation should be resent by:   Will anyone else be joining your video visit? No        Assessment & Plan     MIGUEL (generalized anxiety disorder)  She feels that zoloft continues to be helpful.  Used hydroxyzine when she was at [facility] for anxiety, and it was helpful.  - sertraline (ZOLOFT) 100 MG tablet; Take 1 tablet (100 mg) by mouth daily  - hydrOXYzine (ATARAX) 25 MG tablet; Take 1 tablet (25 mg) by mouth 3 times daily as needed for anxiety    Uncomplicated alcohol dependence (H)  Also found this a great med to help with cravings/.  - naltrexone (DEPADE/REVIA) 50 MG tablet; Take 1 tablet (50 mg) by mouth daily             Depression Screening Follow Up    PHQ 11/5/2022   PHQ-9 Total Score 14   Q9: Thoughts of better off dead/self-harm past 2 weeks Not at all         Follow Up Actions Taken           No follow-ups on file.    Avinash Dyer PA-C  [facility]   Denia is a 37 year old, presenting for the following health issues:  No chief complaint on file.      History of Present Illness       Mental Health Follow-up:  Patient presents to follow-up on Depression & Anxiety.Patient's depression since last visit has been:  Worse  The patient is having other symptoms associated with depression.  Patient's anxiety since last visit has been:  Bad  The patient is having other symptoms associated with anxiety.  Any significant life events: relationship concerns, job concerns and financial concerns  Patient is not feeling anxious or having panic attacks.  Patient has concerns about alcohol or drug use.    She eats 4 or more servings of fruits and vegetables daily.She consumes 0 sweetened beverage(s) daily.She exercises with enough effort to increase her heart rate 10 to 19 minutes per day.  She exercises with enough  effort to increase her heart rate 3 or less days per week.   She is taking medications regularly.    Today's PHQ-9         PHQ-9 Total Score: 14    PHQ-9 Q9 Thoughts of better off dead/self-harm past 2 weeks :   Not at all    How difficult have these problems made it for you to do your work, take care of things at home, or get along with other people: Very difficult  Today's MIGUEL-7 Score: 6       Anxiety Follow-Up    How are you doing with your anxiety since your last visit? Worsened     Are you having other symptoms that might be associated with anxiety? No    Have you had a significant life event? No     Are you feeling depressed? No    Do you have any concerns with your use of alcohol or other drugs? Yes:  alcoholic, continues to stay sober, but working her list of tools    Social History     Tobacco Use     Smoking status: Former     Packs/day: 0.00     Years: 5.00     Pack years: 0.00     Types: Cigarettes     Smokeless tobacco: Never   Substance Use Topics     Alcohol use: Yes     Comment: ocasional     Drug use: Never     MIGUEL-7 SCORE 1/7/2020 10/18/2021 11/5/2022   Total Score 16 (severe anxiety) 3 (minimal anxiety) 6 (mild anxiety)   Total Score 16 3 6     PHQ 1/7/2020 10/18/2021 11/5/2022   PHQ-9 Total Score 17 2 14   Q9: Thoughts of better off dead/self-harm past 2 weeks Not at all Not at all Not at all             Review of Systems   Constitutional, HEENT, cardiovascular, pulmonary, gi and gu systems are negative, except as otherwise noted.      Objective           Vitals:  No vitals were obtained today due to virtual visit.    Physical Exam   GENERAL: Healthy, alert and no distress  EYES: Eyes grossly normal to inspection.  No discharge or erythema, or obvious scleral/conjunctival abnormalities.  RESP: No audible wheeze, cough, or visible cyanosis.  No visible retractions or increased work of breathing.    SKIN: Visible skin clear. No significant rash, abnormal pigmentation or lesions.  NEURO: Cranial  nerves grossly intact.  Mentation and speech appropriate for age.  PSYCH: Mentation appears normal, affect normal/bright, judgement and insight intact, normal speech and appearance well-groomed.                Video-Visit Details    Video Start Time: 1:00 PM    Type of service:  Video Visit    Video End Time:1:16 PM    Originating Location (pt. Location): Home    Distant Location (provider location):  On-site    Platform used for Video Visit: [employer]      Answers for HPI/ROS submitted by the patient on 11/5/2022  If you checked off any problems, how difficult have these problems made it for you to do your work, take care of things at home, or get along with other people?: Very difficult  PHQ9 TOTAL SCORE: 14  MIGUEL 7 TOTAL SCORE: 6  Depression/Anxiety: Depression & Anxiety  Status since last visit:: worse  Anxiety since last: : bad  Other associated symptoms of depression:: Yes  Other associated symotome: : Yes  Significant life event: : relationship concerns, job concerns, financial concerns  Anxious:: No  Current substance use:: Yes  How many servings of fruits and vegetables do you eat daily?: 4 or more  On average, how many sweetened beverages do you drink each day (Examples: soda, juice, sweet tea, etc.  Do NOT count diet or artificially sweetened beverages)?: 0  How many minutes a day do you exercise enough to make your heart beat faster?: 10 to 19  How many days a week do you exercise enough to make your heart beat faster?: 3 or less  How many days per week do you miss taking your medication?: 0

## 2023-04-08 ASSESSMENT — ANXIETY QUESTIONNAIRES
GAD7 TOTAL SCORE: 2
IF YOU CHECKED OFF ANY PROBLEMS ON THIS QUESTIONNAIRE, HOW DIFFICULT HAVE THESE PROBLEMS MADE IT FOR YOU TO DO YOUR WORK, TAKE CARE OF THINGS AT HOME, OR GET ALONG WITH OTHER PEOPLE: SOMEWHAT DIFFICULT
1. FEELING NERVOUS, ANXIOUS, OR ON EDGE: SEVERAL DAYS
6. BECOMING EASILY ANNOYED OR IRRITABLE: NOT AT ALL
7. FEELING AFRAID AS IF SOMETHING AWFUL MIGHT HAPPEN: NOT AT ALL
2. NOT BEING ABLE TO STOP OR CONTROL WORRYING: NOT AT ALL
5. BEING SO RESTLESS THAT IT IS HARD TO SIT STILL: NOT AT ALL
GAD7 TOTAL SCORE: 2
7. FEELING AFRAID AS IF SOMETHING AWFUL MIGHT HAPPEN: NOT AT ALL
3. WORRYING TOO MUCH ABOUT DIFFERENT THINGS: NOT AT ALL
4. TROUBLE RELAXING: SEVERAL DAYS
8. IF YOU CHECKED OFF ANY PROBLEMS, HOW DIFFICULT HAVE THESE MADE IT FOR YOU TO DO YOUR WORK, TAKE CARE OF THINGS AT HOME, OR GET ALONG WITH OTHER PEOPLE?: SOMEWHAT DIFFICULT

## 2023-04-10 NOTE — PROGRESS NOTES
Progress Note    SUBJECTIVE:  Denia Raymond is an 38 year old, , who requests an Annual Preventive Exam.     Concerns today include:   1. Due for Tdap vaccine; last was >10 yrs ago  2. Menstrual hx: Noticed changes in her period in the last 1-2 yrs. Has the Paragard IUD, placed 2019.    Patient's last menstrual period was 2023.  Cyces are q18 days - q21 days; bleeding lasts 9 days; moderate in amount; occasional passage of quarter sized blood clots. Spotting for about 2-3 days between periods as well.   Denies pelvic pain or dyspareunia.  + diarrhea with menses, which isn't new for her. Denies urinary symptoms.  Denies change in vaginal odor, itching, and irritation.   Has been traveling quite a bit and has high stress with trying to find a new job.     Mental Health: Follows with Avinash Dyer PA-C for mental health and alcohol dependence; taking Zoloft, Naltrexone, and Hydroxyzine.  Currently job searching; laid off in January. This has caused increased stress. No acute concerns today.     Social hx:  - Quit cigarette smoking 2022. Remains abstinent.   - No alcohol for about 2.5 yrs. Feels better overall because of it.   - Uses THC gummies to help with sleep. No other recent drug use.     Last pap smear: 2019 NIL, HPV negative --> due 2024  - 1 abnormal pap smear many yrs ago; follow-up has all been normal.     Lipids profile: unsure if ever checked   Mammogram: n/a  Colonoscopy: n/a    Sexual hx: last sexually active 2022.    - Open to STD testing today.   - Concern regarding having a partner who had a previous female partner with a positive HPV pap smear.    Exercise: physically active through biking,  Exercises 4 times per week  Diet: vegetarian diet; focuses on protein with each meal.     Menstrual History:      10/18/2021     5:20 PM 2023     1:13 PM 2023     1:30 PM   Menstrual History   LAST MENSTRUAL PERIOD 10/13/2021  2023   Menarche Age  12 years    Period  Cycle (Days)  24-25    Period Duration (Days)  5-11    Period Pattern  Irregular    Menstrual Flow  Heavy    Dysmenorrhea  Moderate    PMS Symptoms  Cramping;Mood Changes;Diarrhea    Reviewed Today  Yes        HISTORY:  hydrOXYzine (ATARAX) 25 MG tablet, Take 1 tablet (25 mg) by mouth 3 times daily as needed for anxiety  naltrexone (DEPADE/REVIA) 50 MG tablet, Take 1 tablet (50 mg) by mouth daily  sertraline (ZOLOFT) 100 MG tablet, Take 1 tablet (100 mg) by mouth daily    No current facility-administered medications on file prior to visit.    Allergies   Allergen Reactions     Penicillins Rash     rash     Immunization History   Administered Date(s) Administered     COVID-19 Vaccine 18+ (Moderna) 2021, 2021, 2021     HepB, Unspecified 07/15/1996, 08/15/1996     Hepatitis A (ADULT 19+) 2006     Hepatits B (Peds <19Y) 1997, 10/22/2001, 2001, 2002     Historic Hib Hib-titer 1986, 1987     Historical DTP/aP 1984, 1984, 1984, 1984, 1984, 1984, 1986, 1987, 08/15/1989, 1990     Influenza (IIV3) PF 10/15/2008     MMR 1985, 1985, 08/15/1990, 10/18/1995     Meningococcal (Menomune ) 2002, 2004     OPV, trivalent, live 1984, 1984, 1984, 1984, 1984, 1986, 08/15/1989, 1990     TDAP (Adacel,Boostrix) 2023     Td (Adult), Adsorbed 04/10/1996, 2004     Typhoid IM 2006       OB History    Para Term  AB Living   0 0 0 0 0 0   SAB IAB Ectopic Multiple Live Births   0 0 0 0 0     Past Medical History:   Diagnosis Date     Depressive disorder      Urinary tract infection      Wounds and injuries      Past Surgical History:   Procedure Laterality Date     No Surgical history       Family History   Problem Relation Age of Onset     Anxiety Disorder Mother      Diabetes Father      Dementia Paternal Grandmother      Dementia Paternal  Grandfather      Social History     Socioeconomic History     Marital status: Single     Spouse name: None     Number of children: None     Years of education: None     Highest education level: None   Tobacco Use     Smoking status: Former     Packs/day: 0.00     Years: 5.00     Pack years: 0.00     Types: Cigarettes     Quit date: 2022     Years since quittin.7     Smokeless tobacco: Never   Vaping Use     Vaping status: Never Used   Substance and Sexual Activity     Alcohol use: Not Currently     Comment: ocasional     Drug use: Not Currently     Types: Cocaine     Sexual activity: Not Currently     Partners: Male     Birth control/protection: Condom, I.U.D., Male Surgical   Other Topics Concern     Parent/sibling w/ CABG, MI or angioplasty before 65F 55M? No   Social History Narrative          Social Determinants of Health     Intimate Partner Violence: Not At Risk (2023)    Humiliation, Afraid, Rape, and Kick questionnaire      Fear of Current or Ex-Partner: No      Emotionally Abused: No      Physically Abused: No      Sexually Abused: No       ROS  ROS: 10 point ROS neg other than the symptoms noted above in the HPI.        10/18/2021     5:33 PM 2022     4:10 PM 2023     1:20 PM   PHQ-9 SCORE   PHQ-9 Total Score MyChart 2 (Minimal depression) 14 (Moderate depression)    PHQ-9 Total Score 2 14 8         2022     4:15 PM 2023    11:31 AM 2023     1:20 PM   MIGUEL-7 SCORE   Total Score 6 (mild anxiety) 2 (minimal anxiety)    Total Score 6 2 10     EXAM:  Blood pressure 114/78, pulse 65, weight 62.6 kg (138 lb), last menstrual period 2023. Body mass index is 24.45 kg/m .  General - pleasant female in no acute distress.  Skin - no suspicious lesions or rashes  EENT-  euthyroid with out palpable nodules  Neck - supple without lymphadenopathy.  Lungs - clear to auscultation bilaterally.  Heart - regular rate and rhythm without murmur.  Abdomen - soft, nontender, nondistended,  no masses or organomegaly noted.  Musculoskeletal - no gross deformities.  Neurological - normal strength, sensation, and mental status.    Breast Exam:  Breast: Without visible skin changes. No dimpling or lesions seen.   Breasts supple, non-tender with palpation, no dominant mass, nodularity, or nipple discharge noted bilaterally. Axillary nodes negative.      Pelvic Exam:  EG/BUS: Normal genital architecture without lesions, erythema or abnormal secretions; Bartholin's, Urethra, [place]'s normal   Urethral meatus: normal   Urethra: no masses, tenderness, or scarring   Bladder: no masses or tenderness   Vagina: moist, pink, rugae with creamy, white and odorless  secretions  Cervix: pink, moist, closed, without lesion or CMT and IUD strings extend 3 cm from external os.  Uterus: small, smooth, firm, mobile w/o pain  Adnexa: Within normal limits and No masses, nodularity, tenderness  Rectum: anus normal     ASSESSMENT:  Encounter Diagnoses   Name Primary?     Visit for preventive health examination Yes     Screening for lipid disorders      IUD check up      Menorrhagia with regular cycle      Screening for thyroid disorder      History of alcohol use      Screen for STD (sexually transmitted disease)       PLAN:   Orders Placed This Encounter   Procedures     US Transvaginal Non OB     TDAP VACCINE (Adacel, Boostrix)  [[id number]]     Lipid Profile     Comprehensive metabolic panel     TSH with free T4 reflex     CBC with Platelets     HIV Antigen Antibody Combo     Treponema Abs w Reflex to RPR and Titer     1. Heavy menstrual bleeding with Paragad IUD in place: Recommend CBC, TSH, and Pelvic US for further evaluation. Normal IUD string check today. Patient has taken COCs in the past and did not like the way they made her feel. Not currently sexually active. Gonorrhea & chlamydia tests completed today.  Further plan for care will be determined when results are available.     2. Preventive health, Hx of alcohol use:  -  CMP, Lipid profile ordered; pt will return and complete these as fasting labs  - pap smear due 2024  - mammogram due next year  - Tdap vaccine given    3. Screen for STD: tests completed today for gonorrhea & chlamydia. Future order placed for HIV and syphils     Additional teaching done at this visit regarding calcium (1200 mg per day), self breast awareness, exercise, mental health and weight/diet.    Return to clinic in one year.  Follow-up as needed.    Irene Vela, DNP, APRN, WHNP

## 2023-04-11 ENCOUNTER — OFFICE VISIT (OUTPATIENT)
Dept: OBGYN | Facility: CLINIC | Age: 38
End: 2023-04-11
Attending: NURSE PRACTITIONER
Payer: COMMERCIAL

## 2023-04-11 VITALS
BODY MASS INDEX: 24.45 KG/M2 | DIASTOLIC BLOOD PRESSURE: 78 MMHG | WEIGHT: 138 LBS | SYSTOLIC BLOOD PRESSURE: 114 MMHG | HEART RATE: 65 BPM

## 2023-04-11 DIAGNOSIS — Z00.00 VISIT FOR PREVENTIVE HEALTH EXAMINATION: Primary | ICD-10-CM

## 2023-04-11 DIAGNOSIS — Z87.898 HISTORY OF ALCOHOL USE: ICD-10-CM

## 2023-04-11 DIAGNOSIS — Z13.29 SCREENING FOR THYROID DISORDER: ICD-10-CM

## 2023-04-11 DIAGNOSIS — N92.0 MENORRHAGIA WITH REGULAR CYCLE: ICD-10-CM

## 2023-04-11 DIAGNOSIS — Z11.3 SCREEN FOR STD (SEXUALLY TRANSMITTED DISEASE): ICD-10-CM

## 2023-04-11 DIAGNOSIS — Z30.431 IUD CHECK UP: ICD-10-CM

## 2023-04-11 DIAGNOSIS — Z13.220 SCREENING FOR LIPID DISORDERS: ICD-10-CM

## 2023-04-11 PROCEDURE — 90715 TDAP VACCINE 7 YRS/> IM: CPT

## 2023-04-11 PROCEDURE — 87591 N.GONORRHOEAE DNA AMP PROB: CPT | Performed by: NURSE PRACTITIONER

## 2023-04-11 PROCEDURE — G0463 HOSPITAL OUTPT CLINIC VISIT: HCPCS | Mod: 25 | Performed by: NURSE PRACTITIONER

## 2023-04-11 PROCEDURE — 87491 CHLMYD TRACH DNA AMP PROBE: CPT | Performed by: NURSE PRACTITIONER

## 2023-04-11 PROCEDURE — 90471 IMMUNIZATION ADMIN: CPT

## 2023-04-11 PROCEDURE — 99385 PREV VISIT NEW AGE 18-39: CPT | Performed by: NURSE PRACTITIONER

## 2023-04-11 PROCEDURE — 250N000011 HC RX IP 250 OP 636

## 2023-04-11 ASSESSMENT — ANXIETY QUESTIONNAIRES
5. BEING SO RESTLESS THAT IT IS HARD TO SIT STILL: MORE THAN HALF THE DAYS
6. BECOMING EASILY ANNOYED OR IRRITABLE: SEVERAL DAYS
7. FEELING AFRAID AS IF SOMETHING AWFUL MIGHT HAPPEN: NOT AT ALL
2. NOT BEING ABLE TO STOP OR CONTROL WORRYING: NOT AT ALL
GAD7 TOTAL SCORE: 10
1. FEELING NERVOUS, ANXIOUS, OR ON EDGE: NEARLY EVERY DAY
3. WORRYING TOO MUCH ABOUT DIFFERENT THINGS: MORE THAN HALF THE DAYS

## 2023-04-11 ASSESSMENT — PATIENT HEALTH QUESTIONNAIRE - PHQ9
SUM OF ALL RESPONSES TO PHQ QUESTIONS 1-9: 8
5. POOR APPETITE OR OVEREATING: MORE THAN HALF THE DAYS

## 2023-04-11 ASSESSMENT — PAIN SCALES - GENERAL: PAINLEVEL: NO PAIN (0)

## 2023-04-11 NOTE — LETTER
2023       RE: Denia Raymond  [address]  [facility] [address]     Dear Colleague,    Thank you for referring your patient, Denia Raymond, to the [facility] [place] at [facility]. Please see a copy of my visit note below.      Progress Note    SUBJECTIVE:  Denia Raymond is an 38 year old, , who requests an Annual Preventive Exam.     Concerns today include:   1. Due for Tdap vaccine; last was >10 yrs ago  2. Menstrual hx: Noticed changes in her period in the last 1-2 yrs. Has the Paragard IUD, placed 2019.    Patient's last menstrual period was 2023.  Cyces are q18 days - q21 days; bleeding lasts 9 days; moderate in amount; occasional passage of quarter sized blood clots. Spotting for about 2-3 days between periods as well.   Denies pelvic pain or dyspareunia.  + diarrhea with menses, which isn't new for her. Denies urinary symptoms.  Denies change in vaginal odor, itching, and irritation.   Has been traveling quite a bit and has high stress with trying to find a new job.     Mental Health: Follows with Avinash Dyer PA-C for mental health and alcohol dependence; taking Zoloft, Naltrexone, and Hydroxyzine.  Currently job searching; laid off in January. This has caused increased stress. No acute concerns today.     Social hx:  - Quit cigarette smoking 2022. Remains abstinent.   - No alcohol for about 2.5 yrs. Feels better overall because of it.   - Uses THC gummies to help with sleep. No other recent drug use.     Last pap smear: 2019 NIL, HPV negative --> due 2024  - 1 abnormal pap smear many yrs ago; follow-up has all been normal.     Lipids profile: unsure if ever checked   Mammogram: n/a  Colonoscopy: n/a    Sexual hx: last sexually active 2022.    - Open to STD testing today.   - Concern regarding having a partner who had a previous female partner with a positive HPV pap  smear.    Exercise: physically active through biking,  Exercises 4 times per week  Diet: vegetarian diet; focuses on protein with each meal.     Menstrual History:      10/18/2021     5:20 PM 2023     1:13 PM 2023     1:30 PM   Menstrual History   LAST MENSTRUAL PERIOD 10/13/2021  2023   Menarche Age  12 years    Period Cycle (Days)  24-25    Period Duration (Days)  5-11    Period Pattern  Irregular    Menstrual Flow  Heavy    Dysmenorrhea  Moderate    PMS Symptoms  Cramping;Mood Changes;Diarrhea    Reviewed Today  Yes        HISTORY:  hydrOXYzine (ATARAX) 25 MG tablet, Take 1 tablet (25 mg) by mouth 3 times daily as needed for anxiety  naltrexone (DEPADE/REVIA) 50 MG tablet, Take 1 tablet (50 mg) by mouth daily  sertraline (ZOLOFT) 100 MG tablet, Take 1 tablet (100 mg) by mouth daily    No current facility-administered medications on file prior to visit.    Allergies   Allergen Reactions    Penicillins Rash     rash     Immunization History   Administered Date(s) Administered    COVID-19 Vaccine 18+ (Moderna) 2021, 2021, 2021    HepB, Unspecified 07/15/1996, 08/15/1996    Hepatitis A (ADULT 19+) 2006    Hepatits B (Peds <19Y) 1997, 10/22/2001, 2001, 2002    Historic Hib Hib-titer 1986, 1987    Historical DTP/aP 1984, 1984, 1984, 1984, 1984, 1984, 1986, 1987, 08/15/1989, 1990    Influenza (IIV3) PF 10/15/2008    MMR 1985, 1985, 08/15/1990, 10/18/1995    Meningococcal (Menomune ) 2002, 2004    OPV, trivalent, live 1984, 1984, 1984, 1984, 1984, 1986, 08/15/1989, 1990    TDAP (Adacel,Boostrix) 2023    Td (Adult), Adsorbed 04/10/1996, 2004    Typhoid IM 2006       OB History    Para Term  AB Living   0 0 0 0 0 0   SAB IAB Ectopic Multiple Live Births   0 0 0 0 0     Past Medical History:   Diagnosis Date     Depressive disorder     Urinary tract infection     Wounds and injuries      Past Surgical History:   Procedure Laterality Date    No Surgical history       Family History   Problem Relation Age of Onset    Anxiety Disorder Mother     Diabetes Father     Dementia Paternal Grandmother     Dementia Paternal Grandfather      Social History     Socioeconomic History    Marital status: Single     Spouse name: None    Number of children: None    Years of education: None    Highest education level: None   Tobacco Use    Smoking status: Former     Packs/day: 0.00     Years: 5.00     Pack years: 0.00     Types: Cigarettes     Quit date: 2022     Years since quittin.7    Smokeless tobacco: Never   Vaping Use    Vaping status: Never Used   Substance and Sexual Activity    Alcohol use: Not Currently     Comment: ocasional    Drug use: Not Currently     Types: Cocaine    Sexual activity: Not Currently     Partners: Male     Birth control/protection: Condom, I.U.D., Male Surgical   Other Topics Concern    Parent/sibling w/ CABG, MI or angioplasty before 65F 55M? No   Social History Narrative          Social Determinants of Health     Intimate Partner Violence: Not At Risk (2023)    Humiliation, Afraid, Rape, and Kick questionnaire     Fear of Current or Ex-Partner: No     Emotionally Abused: No     Physically Abused: No     Sexually Abused: No       ROS  ROS: 10 point ROS neg other than the symptoms noted above in the HPI.        10/18/2021     5:33 PM 2022     4:10 PM 2023     1:20 PM   PHQ-9 SCORE   PHQ-9 Total Score MyChart 2 (Minimal depression) 14 (Moderate depression)    PHQ-9 Total Score 2 14 8         2022     4:15 PM 2023    11:31 AM 2023     1:20 PM   MIGUEL-7 SCORE   Total Score 6 (mild anxiety) 2 (minimal anxiety)    Total Score 6 2 10     EXAM:  Blood pressure 114/78, pulse 65, weight 62.6 kg (138 lb), last menstrual period 2023. Body mass index is 24.45 kg/m .  General -  pleasant female in no acute distress.  Skin - no suspicious lesions or rashes  EENT-  euthyroid with out palpable nodules  Neck - supple without lymphadenopathy.  Lungs - clear to auscultation bilaterally.  Heart - regular rate and rhythm without murmur.  Abdomen - soft, nontender, nondistended, no masses or organomegaly noted.  Musculoskeletal - no gross deformities.  Neurological - normal strength, sensation, and mental status.    Breast Exam:  Breast: Without visible skin changes. No dimpling or lesions seen.   Breasts supple, non-tender with palpation, no dominant mass, nodularity, or nipple discharge noted bilaterally. Axillary nodes negative.      Pelvic Exam:  EG/BUS: Normal genital architecture without lesions, erythema or abnormal secretions; Bartholin's, Urethra, [place]'s normal   Urethral meatus: normal   Urethra: no masses, tenderness, or scarring   Bladder: no masses or tenderness   Vagina: moist, pink, rugae with creamy, white and odorless  secretions  Cervix: pink, moist, closed, without lesion or CMT and IUD strings extend 3 cm from external os.  Uterus: small, smooth, firm, mobile w/o pain  Adnexa: Within normal limits and No masses, nodularity, tenderness  Rectum: anus normal     ASSESSMENT:  Encounter Diagnoses   Name Primary?    Visit for preventive health examination Yes    Screening for lipid disorders     IUD check up     Menorrhagia with regular cycle     Screening for thyroid disorder     History of alcohol use     Screen for STD (sexually transmitted disease)       PLAN:   Orders Placed This Encounter   Procedures    US Transvaginal Non OB    TDAP VACCINE (Adacel, Boostrix)  [[id number]]    Lipid Profile    Comprehensive metabolic panel    TSH with free T4 reflex    CBC with Platelets    HIV Antigen Antibody Combo    Treponema Abs w Reflex to RPR and Titer     1. Heavy menstrual bleeding with Paragad IUD in place: Recommend CBC, TSH, and Pelvic US for further evaluation. Normal IUD string check  today. Patient has taken COCs in the past and did not like the way they made her feel. Not currently sexually active. Gonorrhea & chlamydia tests completed today.  Further plan for care will be determined when results are available.     2. Preventive health, Hx of alcohol use:  - CMP, Lipid profile ordered; pt will return and complete these as fasting labs  - pap smear due 2024  - mammogram due next year  - Tdap vaccine given    3. Screen for STD: tests completed today for gonorrhea & chlamydia. Future order placed for HIV and syphils     Additional teaching done at this visit regarding calcium (1200 mg per day), self breast awareness, exercise, mental health and weight/diet.    Return to clinic in one year.  Follow-up as needed.    Irene Vela, DNP, APRN, WHNP

## 2023-04-11 NOTE — PATIENT INSTRUCTIONS
Thank you for trusting us with your care!     If you need to contact us for questions about:  Symptoms, Scheduling & Medical Questions; Non-urgent (2-3 day response) Balbir message, Urgent (needing response today) [phone] (if after 3:30pm next day response)   Prescriptions: Please call your Pharmacy   Billing: [place] [phone] or [facility]:[phone]

## 2023-04-12 LAB
C TRACH DNA SPEC QL NAA+PROBE: NEGATIVE
N GONORRHOEA DNA SPEC QL NAA+PROBE: NEGATIVE

## 2023-04-19 ENCOUNTER — ANCILLARY PROCEDURE (OUTPATIENT)
Dept: ULTRASOUND IMAGING | Facility: CLINIC | Age: 38
End: 2023-04-19
Attending: NURSE PRACTITIONER
Payer: COMMERCIAL

## 2023-04-19 DIAGNOSIS — N92.0 MENORRHAGIA WITH REGULAR CYCLE: ICD-10-CM

## 2023-04-19 PROCEDURE — 76856 US EXAM PELVIC COMPLETE: CPT

## 2023-04-19 PROCEDURE — 76856 US EXAM PELVIC COMPLETE: CPT | Mod: 26 | Performed by: OBSTETRICS & GYNECOLOGY

## 2023-04-19 PROCEDURE — 76830 TRANSVAGINAL US NON-OB: CPT | Mod: 26 | Performed by: OBSTETRICS & GYNECOLOGY

## 2023-04-20 NOTE — RESULT ENCOUNTER NOTE
Notified patient of malpositioned IUD by phone today. Recommended IUD removal due to current bleeding and concern for decreased effectiveness for contraception. Counseled on alternative options. Pt is interested in LNG-IUD.  Reviewed common SE and bleeding pattern. Pt will consider Paragard IUD replacement vs placement of LNG IUD at time of removal. Pt [facility]. All questions were answered at the end of this call.    Irene Vela, DNP, APRN, WHNP

## 2023-10-30 ENCOUNTER — MYC REFILL (OUTPATIENT)
Dept: FAMILY MEDICINE | Facility: CLINIC | Age: 38
End: 2023-10-30
Payer: COMMERCIAL

## 2023-10-30 DIAGNOSIS — F41.1 GAD (GENERALIZED ANXIETY DISORDER): ICD-10-CM

## 2023-10-30 DIAGNOSIS — F10.20 UNCOMPLICATED ALCOHOL DEPENDENCE (H): ICD-10-CM

## 2023-10-30 RX ORDER — NALTREXONE HYDROCHLORIDE 50 MG/1
50 TABLET, FILM COATED ORAL DAILY
Qty: 90 TABLET | Refills: 0 | Status: SHIPPED | OUTPATIENT
Start: 2023-10-30

## 2023-10-30 RX ORDER — SERTRALINE HYDROCHLORIDE 100 MG/1
100 TABLET, FILM COATED ORAL DAILY
Qty: 90 TABLET | Refills: 0 | Status: SHIPPED | OUTPATIENT
Start: 2023-10-30

## 2024-01-26 DIAGNOSIS — F41.1 GAD (GENERALIZED ANXIETY DISORDER): ICD-10-CM

## 2024-01-26 RX ORDER — SERTRALINE HYDROCHLORIDE 100 MG/1
100 TABLET, FILM COATED ORAL DAILY
Qty: 90 TABLET | Refills: 0 | OUTPATIENT
Start: 2024-01-26

## 2024-02-24 ENCOUNTER — HEALTH MAINTENANCE LETTER (OUTPATIENT)
Age: 39
End: 2024-02-24

## 2024-03-06 ENCOUNTER — PATIENT OUTREACH (OUTPATIENT)
Dept: CARE COORDINATION | Facility: CLINIC | Age: 39
End: 2024-03-06
Payer: COMMERCIAL

## 2024-03-06 NOTE — PROGRESS NOTES
Clinic Care Coordination Contact  Program:   [facility]: [place]   Renewal: [facility]   Date Applied:      LINDA Outreach:   3/6/24: 1st outreach attempt. Left a message on voicemail with call back information and requested return call.  Plan: CTA will call again within 2 weeks.  Amaya Sibley  Care   [facility]  Clinic Care Coordination  [phone]       Health Insurance:        Referral/Screening:

## 2024-03-12 ENCOUNTER — PATIENT OUTREACH (OUTPATIENT)
Dept: CARE COORDINATION | Facility: CLINIC | Age: 39
End: 2024-03-12
Payer: COMMERCIAL

## 2024-04-03 ENCOUNTER — PATIENT OUTREACH (OUTPATIENT)
Dept: CARE COORDINATION | Facility: CLINIC | Age: 39
End: 2024-04-03
Payer: COMMERCIAL

## 2024-04-03 NOTE — PROGRESS NOTES
Clinic Care Coordination Contact  Program:   [facility]: [place]   Renewal: [facility]   Date Applied:      LINDA Outreach:   4/3/24: 2nd outreach attempt. Left message on voicemail indicating last outreach attempt. CTA left [facility] number for renewal follow up.  Plan: CTA will no longer make outreach  Amaya Narvaez   [facility]  Clinic Care Coordination  [phone]    3/6/24: 1st outreach attempt. Left a message on voicemail with call back information and requested return call.  Plan: CTA will call again within 2 weeks.  Amaya Narvaez   [facility]  Clinic Care Coordination  [phone]       Health Insurance:        Referral/Screening:

## 2024-07-13 ENCOUNTER — HEALTH MAINTENANCE LETTER (OUTPATIENT)
Age: 39
End: 2024-07-13

## 2024-10-08 ENCOUNTER — PATIENT OUTREACH (OUTPATIENT)
Dept: CARE COORDINATION | Facility: CLINIC | Age: 39
End: 2024-10-08
Payer: COMMERCIAL

## 2024-11-07 ASSESSMENT — ANXIETY QUESTIONNAIRES: GAD7 TOTAL SCORE: 10

## 2025-07-06 ENCOUNTER — HOSPITAL ENCOUNTER (EMERGENCY)
Facility: CLINIC | Age: 40
Discharge: HOME | End: 2025-07-06
Attending: EMERGENCY MEDICINE | Admitting: EMERGENCY MEDICINE
Payer: COMMERCIAL

## 2025-07-06 ENCOUNTER — APPOINTMENT (OUTPATIENT)
Dept: CT IMAGING | Facility: CLINIC | Age: 40
End: 2025-07-06
Attending: EMERGENCY MEDICINE
Payer: COMMERCIAL

## 2025-07-06 VITALS
HEIGHT: 63 IN | DIASTOLIC BLOOD PRESSURE: 78 MMHG | HEART RATE: 72 BPM | WEIGHT: 120 LBS | RESPIRATION RATE: 16 BRPM | SYSTOLIC BLOOD PRESSURE: 118 MMHG | TEMPERATURE: 98.3 F | BODY MASS INDEX: 21.26 KG/M2 | OXYGEN SATURATION: 99 %

## 2025-07-06 DIAGNOSIS — K57.32 SIGMOID DIVERTICULITIS: ICD-10-CM

## 2025-07-06 DIAGNOSIS — T83.9XXA COMPLICATION OF INTRAUTERINE DEVICE (IUD), UNSPECIFIED COMPLICATION, INITIAL ENCOUNTER: ICD-10-CM

## 2025-07-06 LAB
ALBUMIN SERPL BCG-MCNC: 4.1 G/DL (ref 3.5–5.2)
ALBUMIN UR-MCNC: NEGATIVE MG/DL
ALP SERPL-CCNC: 43 U/L (ref 40–150)
ALT SERPL W P-5'-P-CCNC: 9 U/L (ref 0–50)
ANION GAP SERPL CALCULATED.3IONS-SCNC: 10 MMOL/L (ref 7–15)
APPEARANCE UR: CLEAR
AST SERPL W P-5'-P-CCNC: 10 U/L (ref 0–45)
BASOPHILS # BLD AUTO: 0 10E3/UL (ref 0–0.2)
BASOPHILS NFR BLD AUTO: 0 %
BILIRUB SERPL-MCNC: 0.8 MG/DL
BILIRUB UR QL STRIP: NEGATIVE
BUN SERPL-MCNC: 4.4 MG/DL (ref 6–20)
CALCIUM SERPL-MCNC: 9.1 MG/DL (ref 8.8–10.4)
CHLORIDE SERPL-SCNC: 102 MMOL/L (ref 98–107)
COLOR UR AUTO: NORMAL
CREAT SERPL-MCNC: 0.55 MG/DL (ref 0.51–0.95)
EGFRCR SERPLBLD CKD-EPI 2021: >90 ML/MIN/1.73M2
EOSINOPHIL # BLD AUTO: 0.2 10E3/UL (ref 0–0.7)
EOSINOPHIL NFR BLD AUTO: 2 %
ERYTHROCYTE [DISTWIDTH] IN BLOOD BY AUTOMATED COUNT: 12.6 % (ref 10–15)
GLUCOSE SERPL-MCNC: 98 MG/DL (ref 70–99)
GLUCOSE UR STRIP-MCNC: NEGATIVE MG/DL
HCG SERPL QL: NEGATIVE
HCO3 SERPL-SCNC: 26 MMOL/L (ref 22–29)
HCT VFR BLD AUTO: 40 % (ref 35–47)
HGB BLD-MCNC: 13.4 G/DL (ref 11.7–15.7)
HGB UR QL STRIP: NEGATIVE
IMM GRANULOCYTES # BLD: 0 10E3/UL
IMM GRANULOCYTES NFR BLD: 0 %
KETONES UR STRIP-MCNC: NEGATIVE MG/DL
LEUKOCYTE ESTERASE UR QL STRIP: NEGATIVE
LIPASE SERPL-CCNC: 22 U/L (ref 13–60)
LYMPHOCYTES # BLD AUTO: 1.5 10E3/UL (ref 0.8–5.3)
LYMPHOCYTES NFR BLD AUTO: 13 %
MCH RBC QN AUTO: 30.9 PG (ref 26.5–33)
MCHC RBC AUTO-ENTMCNC: 33.5 G/DL (ref 31.5–36.5)
MCV RBC AUTO: 92 FL (ref 78–100)
MONOCYTES # BLD AUTO: 1 10E3/UL (ref 0–1.3)
MONOCYTES NFR BLD AUTO: 9 %
NEUTROPHILS # BLD AUTO: 8.8 10E3/UL (ref 1.6–8.3)
NEUTROPHILS NFR BLD AUTO: 76 %
NITRATE UR QL: NEGATIVE
NRBC # BLD AUTO: 0 10E3/UL
NRBC BLD AUTO-RTO: 0 /100
PH UR STRIP: 6.5 [PH] (ref 5–7)
PLATELET # BLD AUTO: 256 10E3/UL (ref 150–450)
POTASSIUM SERPL-SCNC: 3.8 MMOL/L (ref 3.4–5.3)
PROT SERPL-MCNC: 6.9 G/DL (ref 6.4–8.3)
RBC # BLD AUTO: 4.34 10E6/UL (ref 3.8–5.2)
RBC URINE: 2 /HPF
SODIUM SERPL-SCNC: 138 MMOL/L (ref 135–145)
SP GR UR STRIP: 1 (ref 1–1.03)
SQUAMOUS EPITHELIAL: 1 /HPF
UROBILINOGEN UR STRIP-MCNC: NORMAL MG/DL
WBC # BLD AUTO: 11.6 10E3/UL (ref 4–11)
WBC URINE: <1 /HPF

## 2025-07-06 PROCEDURE — 250N000009 HC RX 250: Performed by: EMERGENCY MEDICINE

## 2025-07-06 PROCEDURE — 84703 CHORIONIC GONADOTROPIN ASSAY: CPT | Performed by: EMERGENCY MEDICINE

## 2025-07-06 PROCEDURE — 99285 EMERGENCY DEPT VISIT HI MDM: CPT | Mod: 25

## 2025-07-06 PROCEDURE — 250N000011 HC RX IP 250 OP 636: Performed by: EMERGENCY MEDICINE

## 2025-07-06 PROCEDURE — 96361 HYDRATE IV INFUSION ADD-ON: CPT

## 2025-07-06 PROCEDURE — 74177 CT ABD & PELVIS W/CONTRAST: CPT

## 2025-07-06 PROCEDURE — 82040 ASSAY OF SERUM ALBUMIN: CPT | Performed by: EMERGENCY MEDICINE

## 2025-07-06 PROCEDURE — 85018 HEMOGLOBIN: CPT | Performed by: EMERGENCY MEDICINE

## 2025-07-06 PROCEDURE — 96374 THER/PROPH/DIAG INJ IV PUSH: CPT | Mod: 59

## 2025-07-06 PROCEDURE — 83690 ASSAY OF LIPASE: CPT | Performed by: EMERGENCY MEDICINE

## 2025-07-06 PROCEDURE — 36415 COLL VENOUS BLD VENIPUNCTURE: CPT | Performed by: EMERGENCY MEDICINE

## 2025-07-06 PROCEDURE — 96375 TX/PRO/DX INJ NEW DRUG ADDON: CPT

## 2025-07-06 PROCEDURE — 81003 URINALYSIS AUTO W/O SCOPE: CPT | Performed by: EMERGENCY MEDICINE

## 2025-07-06 PROCEDURE — 258N000003 HC RX IP 258 OP 636: Performed by: EMERGENCY MEDICINE

## 2025-07-06 RX ORDER — ONDANSETRON 4 MG/1
4 TABLET, ORALLY DISINTEGRATING ORAL EVERY 8 HOURS PRN
Qty: 10 TABLET | Refills: 0 | Status: SHIPPED | OUTPATIENT
Start: 2025-07-06

## 2025-07-06 RX ORDER — METRONIDAZOLE 500 MG/1
500 TABLET ORAL 2 TIMES DAILY
Qty: 14 TABLET | Refills: 0 | Status: SHIPPED | OUTPATIENT
Start: 2025-07-06 | End: 2025-07-06

## 2025-07-06 RX ORDER — METRONIDAZOLE 500 MG/1
500 TABLET ORAL 2 TIMES DAILY
Qty: 14 TABLET | Refills: 0 | Status: SHIPPED | OUTPATIENT
Start: 2025-07-06

## 2025-07-06 RX ORDER — CIPROFLOXACIN 500 MG/1
500 TABLET, FILM COATED ORAL 2 TIMES DAILY
Qty: 14 TABLET | Refills: 0 | Status: SHIPPED | OUTPATIENT
Start: 2025-07-06 | End: 2025-07-06

## 2025-07-06 RX ORDER — DOCUSATE SODIUM 100 MG/1
100 CAPSULE, LIQUID FILLED ORAL 2 TIMES DAILY
Qty: 20 CAPSULE | Refills: 0 | Status: SHIPPED | OUTPATIENT
Start: 2025-07-06 | End: 2025-07-06

## 2025-07-06 RX ORDER — CIPROFLOXACIN 500 MG/1
500 TABLET, FILM COATED ORAL 2 TIMES DAILY
Qty: 14 TABLET | Refills: 0 | Status: SHIPPED | OUTPATIENT
Start: 2025-07-06

## 2025-07-06 RX ORDER — ONDANSETRON 2 MG/ML
4 INJECTION INTRAMUSCULAR; INTRAVENOUS EVERY 30 MIN PRN
Status: DISCONTINUED | OUTPATIENT
Start: 2025-07-06 | End: 2025-07-06 | Stop reason: HOSPADM

## 2025-07-06 RX ORDER — DOCUSATE SODIUM 100 MG/1
100 CAPSULE, LIQUID FILLED ORAL 2 TIMES DAILY
Qty: 20 CAPSULE | Refills: 0 | Status: SHIPPED | OUTPATIENT
Start: 2025-07-06

## 2025-07-06 RX ORDER — IOPAMIDOL 755 MG/ML
60 INJECTION, SOLUTION INTRAVASCULAR ONCE
Status: COMPLETED | OUTPATIENT
Start: 2025-07-06 | End: 2025-07-06

## 2025-07-06 RX ORDER — KETOROLAC TROMETHAMINE 15 MG/ML
15 INJECTION, SOLUTION INTRAMUSCULAR; INTRAVENOUS ONCE
Status: COMPLETED | OUTPATIENT
Start: 2025-07-06 | End: 2025-07-06

## 2025-07-06 RX ORDER — ONDANSETRON 4 MG/1
4 TABLET, ORALLY DISINTEGRATING ORAL EVERY 8 HOURS PRN
Qty: 10 TABLET | Refills: 0 | Status: SHIPPED | OUTPATIENT
Start: 2025-07-06 | End: 2025-07-06

## 2025-07-06 RX ADMIN — IOPAMIDOL 60 ML: 755 INJECTION, SOLUTION INTRAVENOUS at 08:34

## 2025-07-06 RX ADMIN — KETOROLAC TROMETHAMINE 15 MG: 15 INJECTION, SOLUTION INTRAMUSCULAR; INTRAVENOUS at 08:27

## 2025-07-06 RX ADMIN — SODIUM CHLORIDE 1000 ML: 0.9 INJECTION, SOLUTION INTRAVENOUS at 07:59

## 2025-07-06 RX ADMIN — ONDANSETRON 4 MG: 2 INJECTION, SOLUTION INTRAMUSCULAR; INTRAVENOUS at 08:06

## 2025-07-06 RX ADMIN — SODIUM CHLORIDE 60 ML: 9 INJECTION, SOLUTION INTRAVENOUS at 08:34

## 2025-07-06 ASSESSMENT — ACTIVITIES OF DAILY LIVING (ADL)
ADLS_ACUITY_SCORE: 41
ADLS_ACUITY_SCORE: 41

## 2025-07-06 ASSESSMENT — COLUMBIA-SUICIDE SEVERITY RATING SCALE - C-SSRS
2. HAVE YOU ACTUALLY HAD ANY THOUGHTS OF KILLING YOURSELF IN THE PAST MONTH?: NO
1. IN THE PAST MONTH, HAVE YOU WISHED YOU WERE DEAD OR WISHED YOU COULD GO TO SLEEP AND NOT WAKE UP?: NO
6. HAVE YOU EVER DONE ANYTHING, STARTED TO DO ANYTHING, OR PREPARED TO DO ANYTHING TO END YOUR LIFE?: NO

## 2025-07-06 NOTE — ED PROVIDER NOTES
"  Emergency Department Note      History of Present Illness     Chief Complaint   Abdominal Pain    HPI   Denia Raymond is a 40 year old female who presents via car from home accompanied by significant other with chief complaint of abdominal pain. The patient reports onset of LLQ abdominal pain 4 days ago (7/2/25) that she describes as \"scooping out her ovaries\". Last night (7/5/25), the pain worsened to a sharp stabbing sensation. The abdominal pain is exacerbated when laying on her left side, during urination, and breathing. No dysuria. At present, the pain radiates to her flank. She states her abdomen is swollen and tender. She has not experienced these symptoms before. She does not take daily medications. She denies possibility of pregnancy, IUD in place. No history of abdominal surgery. No history of ovarian cysts. She is unsure of when her last menstrual period was.     Independent Historian   Significant other present at the bedside.    Review of External Notes   I reviewed family medicine from 12/12/2024 for sinusitis.     Past Medical History     Medical History and Problem List   Major depressive disorder  Urinary tract infection    Medications   Hydroxyzine  Naltrexone  Sertraline    Physical Exam     Patient Vitals for the past 24 hrs:   BP Temp Temp src Pulse Resp SpO2 Height Weight   07/06/25 0924 -- -- -- -- -- 99 % -- --   07/06/25 0923 118/78 -- -- 72 -- -- -- --   07/06/25 0725 -- -- -- -- -- 99 % -- --   07/06/25 0721 114/67 98.3  F (36.8  C) Temporal 93 16 -- 1.6 m (5' 3\") 54.4 kg (120 lb)     Physical Exam  General: Alert, interactive   Head:  Scalp is atraumatic  Eyes:  No scleral icterus  ENT:      Nose:  The external nose is normal  Ears:  External ears are normal  Mouth/Throat: Mucus membranes are moist       Neck:  Normal range of motion.      There is no rigidity.    Trachea is in the midline         CV:  Regular rate and rhythm    No murmur   Resp:  Breath sounds are clear " bilaterally    Non-labored, no retractions or accessory muscle use      GI:  Abdomen is soft, no distension. LLQ tenderness.  Voluntary guarding.       MS:  Normal strength in all 4 extremities  Skin:  Warm and dry, No rash or lesions noted.  Psych: Awake. Alert.  Normal affect.      Appropriate interactions.    Diagnostics     Lab Results   Labs Ordered and Resulted from Time of ED Arrival to Time of ED Departure   COMPREHENSIVE METABOLIC PANEL - Abnormal       Result Value    Sodium 138      Potassium 3.8      Carbon Dioxide (CO2) 26      Anion Gap 10      Urea Nitrogen 4.4 (*)     Creatinine 0.55      GFR Estimate >90      Calcium 9.1      Chloride 102      Glucose 98      Alkaline Phosphatase 43      AST 10      ALT 9      Protein Total 6.9      Albumin 4.1      Bilirubin Total 0.8     CBC WITH PLATELETS AND DIFFERENTIAL - Abnormal    WBC Count 11.6 (*)     RBC Count 4.34      Hemoglobin 13.4      Hematocrit 40.0      MCV 92      MCH 30.9      MCHC 33.5      RDW 12.6      Platelet Count 256      % Neutrophils 76      % Lymphocytes 13      % Monocytes 9      % Eosinophils 2      % Basophils 0      % Immature Granulocytes 0      NRBCs per 100 WBC 0      Absolute Neutrophils 8.8 (*)     Absolute Lymphocytes 1.5      Absolute Monocytes 1.0      Absolute Eosinophils 0.2      Absolute Basophils 0.0      Absolute Immature Granulocytes 0.0      Absolute NRBCs 0.0     LIPASE - Normal    Lipase 22     HCG QUALITATIVE PREGNANCY - Normal    hCG Serum Qualitative Negative     ROUTINE UA WITH MICROSCOPIC REFLEX TO CULTURE - Normal    Color Urine Straw      Appearance Urine Clear      Glucose Urine Negative      Bilirubin Urine Negative      Ketones Urine Negative      Specific Gravity Urine 1.005      Blood Urine Negative      pH Urine 6.5      Protein Albumin Urine Negative      Urobilinogen Urine Normal      Nitrite Urine Negative      Leukocyte Esterase Urine Negative      RBC Urine 2      WBC Urine <1      Squamous  Epithelials Urine 1         Imaging   CT Abdomen Pelvis w Contrast   Final Result   IMPRESSION:    1.  Mild acute uncomplicated sigmoid diverticulitis.   2.  Large amount of formed stool throughout the colon.   3.  Intrauterine device in the central uterus. The right arm of the IUD appears to be angled into the myometrium, not well assessed by CT. Consider a follow-up pelvic ultrasound to assess position of the IUD.           Independent Interpretation   None    ED Course      Medications Administered   Medications   ondansetron (ZOFRAN) injection 4 mg (4 mg Intravenous $Given 7/6/25 0806)   ketorolac (TORADOL) injection 15 mg (15 mg Intravenous $Given 7/6/25 0827)   sodium chloride 0.9% BOLUS 1,000 mL (0 mLs Intravenous Stopped 7/6/25 0921)   iopamidol (ISOVUE-370) solution 60 mL (60 mLs Intravenous $Given 7/6/25 0834)   sodium chloride 0.9 % bag for CT scan flush (60 mLs Intravenous $Given 7/6/25 0834)     Procedures   Procedures     Discussion of Management   None    ED Course   ED Course as of 07/06/25 1006   Sun Jul 06, 2025   0731 I obtained history and examined the patient as noted above.    0910 I rechecked and updated the patient. We discussed plan for discharge and the patient is comfortable with this.      Additional Documentation  None    Medical Decision Making / Diagnosis     CMS Diagnoses: None    [facility]   None    [facility]   Denia Raymond is a 40 year old female who presents with abdominal pain concerning for diverticulitis. CT confirms diverticulitis without abscess or perforation. Her pain has been controlled by the interventions in the emergency department. On recheck, she has a non-surgical exam, pain is controlled, and she is tolerating POs.  I discussed indications for admission versus discharge and together we opted for outpatient management.  I will prescribe ciprofloxacin and flagyl and supportive medications as per below.  We discussed the natural history of this problem, and I discussed  dietary precautions. I recommended she return to the ED for worsening pain, fever, vomiting, bloody or black stools, or for any other concerning symptoms. I recommended she follow up with her primary care physician in 2-3 days.  Patient's IUD also appears to be potentially malpositioned on CT scan, I informed her of this and have advised an outpatient ultrasound at the discretion of her gynecologist.    Disposition   The patient was discharged.     Diagnosis     ICD-10-CM    1. Sigmoid diverticulitis  K57.32       2. Complication of intrauterine device (IUD), unspecified complication, initial encounter  T83.9XXA     Possibly malpositioned, obtain outpatient uterine ultrasound         Discharge Medications   Discharge Medication List as of 7/6/2025  9:31 AM        START taking these medications    Details   ciprofloxacin (CIPRO) 500 MG tablet Take 1 tablet (500 mg) by mouth 2 times daily for 7 days., Disp-14 tablet, R-0, E-Prescribe      docusate sodium (COLACE) 100 MG capsule Take 1 capsule (100 mg) by mouth 2 times daily., Disp-20 capsule, R-0, E-Prescribe      metroNIDAZOLE (FLAGYL) 500 MG tablet Take 1 tablet (500 mg) by mouth 2 times daily for 7 days., Disp-14 tablet, R-0, E-PrescribeEat yogurt or cottage cheese daily to prevent diarrhea that can be caused by taking this medication.      ondansetron (ZOFRAN ODT) 4 MG ODT tab Take 1 tablet (4 mg) by mouth every 8 hours as needed for nausea., Disp-10 tablet, R-0, E-Prescribe           Scribe Disclosure:  I, Enrique Funes, am serving as a scribe at 7:29 AM on 7/6/2025 to document services personally performed by Иван Conrad MD based on my observations and the provider's statements to me.        Иван Conrad MD  07/06/25 1007

## 2025-07-06 NOTE — ED TRIAGE NOTES
Left lower abdominal pain for 4 days, worse today, pain is moving to back today also. Pain 7/10. Nausea. Denies dysuria or hematuria. Denies fevers.      Triage Assessment (Adult)       Row Name 07/06/25 0720          Triage Assessment    Airway WDL WDL        Respiratory WDL    Respiratory WDL WDL        Skin Circulation/Temperature WDL    Skin Circulation/Temperature WDL WDL        Cardiac WDL    Cardiac WDL WDL        Peripheral/Neurovascular WDL    Peripheral Neurovascular WDL WDL        Cognitive/Neuro/Behavioral WDL    Cognitive/Neuro/Behavioral WDL WDL

## 2025-07-17 ENCOUNTER — HOSPITAL ENCOUNTER (INPATIENT)
Facility: CLINIC | Age: 40
End: 2025-07-17
Attending: EMERGENCY MEDICINE | Admitting: HOSPITALIST
Payer: COMMERCIAL

## 2025-07-17 ENCOUNTER — APPOINTMENT (OUTPATIENT)
Dept: CT IMAGING | Facility: CLINIC | Age: 40
End: 2025-07-17
Attending: EMERGENCY MEDICINE
Payer: COMMERCIAL

## 2025-07-17 VITALS
HEART RATE: 59 BPM | SYSTOLIC BLOOD PRESSURE: 111 MMHG | RESPIRATION RATE: 16 BRPM | DIASTOLIC BLOOD PRESSURE: 72 MMHG | OXYGEN SATURATION: 100 % | TEMPERATURE: 98.4 F

## 2025-07-17 DIAGNOSIS — K65.1 INTRA-ABDOMINAL ABSCESS (H): ICD-10-CM

## 2025-07-17 DIAGNOSIS — K57.92 DIVERTICULITIS: ICD-10-CM

## 2025-07-17 DIAGNOSIS — K57.80 DIVERTICULITIS OF INTESTINE WITH ABSCESS: Primary | ICD-10-CM

## 2025-07-17 LAB
ALBUMIN SERPL BCG-MCNC: 4.2 G/DL (ref 3.5–5.2)
ALBUMIN UR-MCNC: NEGATIVE MG/DL
ALP SERPL-CCNC: 37 U/L (ref 40–150)
ALT SERPL W P-5'-P-CCNC: 11 U/L (ref 0–50)
ANION GAP SERPL CALCULATED.3IONS-SCNC: 11 MMOL/L (ref 7–15)
APPEARANCE UR: CLEAR
AST SERPL W P-5'-P-CCNC: 12 U/L (ref 0–45)
BASOPHILS # BLD AUTO: 0.1 10E3/UL (ref 0–0.2)
BASOPHILS NFR BLD AUTO: 1 %
BILIRUB SERPL-MCNC: 0.4 MG/DL
BILIRUB UR QL STRIP: NEGATIVE
BUN SERPL-MCNC: 6.2 MG/DL (ref 6–20)
CALCIUM SERPL-MCNC: 8.9 MG/DL (ref 8.8–10.4)
CHLORIDE SERPL-SCNC: 104 MMOL/L (ref 98–107)
COLOR UR AUTO: NORMAL
CREAT SERPL-MCNC: 0.6 MG/DL (ref 0.51–0.95)
EGFRCR SERPLBLD CKD-EPI 2021: >90 ML/MIN/1.73M2
EOSINOPHIL # BLD AUTO: 0.2 10E3/UL (ref 0–0.7)
EOSINOPHIL NFR BLD AUTO: 2 %
ERYTHROCYTE [DISTWIDTH] IN BLOOD BY AUTOMATED COUNT: 12.8 % (ref 10–15)
GLUCOSE SERPL-MCNC: 78 MG/DL (ref 70–99)
GLUCOSE UR STRIP-MCNC: NEGATIVE MG/DL
HCG SERPL QL: NEGATIVE
HCO3 SERPL-SCNC: 25 MMOL/L (ref 22–29)
HCT VFR BLD AUTO: 38.8 % (ref 35–47)
HGB BLD-MCNC: 13.1 G/DL (ref 11.7–15.7)
HGB UR QL STRIP: NEGATIVE
IMM GRANULOCYTES # BLD: 0 10E3/UL
IMM GRANULOCYTES NFR BLD: 0 %
KETONES UR STRIP-MCNC: NEGATIVE MG/DL
LACTATE SERPL-SCNC: 0.7 MMOL/L (ref 0.7–2)
LEUKOCYTE ESTERASE UR QL STRIP: NEGATIVE
LIPASE SERPL-CCNC: 34 U/L (ref 13–60)
LYMPHOCYTES # BLD AUTO: 2.2 10E3/UL (ref 0.8–5.3)
LYMPHOCYTES NFR BLD AUTO: 23 %
MCH RBC QN AUTO: 30.7 PG (ref 26.5–33)
MCHC RBC AUTO-ENTMCNC: 33.8 G/DL (ref 31.5–36.5)
MCV RBC AUTO: 91 FL (ref 78–100)
MONOCYTES # BLD AUTO: 0.6 10E3/UL (ref 0–1.3)
MONOCYTES NFR BLD AUTO: 6 %
NEUTROPHILS # BLD AUTO: 6.4 10E3/UL (ref 1.6–8.3)
NEUTROPHILS NFR BLD AUTO: 68 %
NITRATE UR QL: NEGATIVE
NRBC # BLD AUTO: 0 10E3/UL
NRBC BLD AUTO-RTO: 0 /100
PH UR STRIP: 7 [PH] (ref 5–7)
PLATELET # BLD AUTO: 309 10E3/UL (ref 150–450)
POTASSIUM SERPL-SCNC: 3.4 MMOL/L (ref 3.4–5.3)
PROT SERPL-MCNC: 6.7 G/DL (ref 6.4–8.3)
RBC # BLD AUTO: 4.27 10E6/UL (ref 3.8–5.2)
RBC URINE: 0 /HPF
SODIUM SERPL-SCNC: 140 MMOL/L (ref 135–145)
SP GR UR STRIP: 1 (ref 1–1.03)
SQUAMOUS EPITHELIAL: <1 /HPF
UROBILINOGEN UR STRIP-MCNC: NORMAL MG/DL
WBC # BLD AUTO: 9.5 10E3/UL (ref 4–11)
WBC URINE: <1 /HPF

## 2025-07-17 PROCEDURE — 99285 EMERGENCY DEPT VISIT HI MDM: CPT | Mod: 25

## 2025-07-17 PROCEDURE — 250N000011 HC RX IP 250 OP 636: Performed by: HOSPITALIST

## 2025-07-17 PROCEDURE — 84703 CHORIONIC GONADOTROPIN ASSAY: CPT | Performed by: EMERGENCY MEDICINE

## 2025-07-17 PROCEDURE — 250N000009 HC RX 250: Performed by: EMERGENCY MEDICINE

## 2025-07-17 PROCEDURE — 96374 THER/PROPH/DIAG INJ IV PUSH: CPT | Mod: 59

## 2025-07-17 PROCEDURE — 87040 BLOOD CULTURE FOR BACTERIA: CPT | Performed by: HOSPITALIST

## 2025-07-17 PROCEDURE — 74177 CT ABD & PELVIS W/CONTRAST: CPT

## 2025-07-17 PROCEDURE — 120N000001 HC R&B MED SURG/OB

## 2025-07-17 PROCEDURE — 250N000011 HC RX IP 250 OP 636: Performed by: EMERGENCY MEDICINE

## 2025-07-17 PROCEDURE — 82565 ASSAY OF CREATININE: CPT | Performed by: EMERGENCY MEDICINE

## 2025-07-17 PROCEDURE — 81001 URINALYSIS AUTO W/SCOPE: CPT | Performed by: EMERGENCY MEDICINE

## 2025-07-17 PROCEDURE — 83605 ASSAY OF LACTIC ACID: CPT | Performed by: EMERGENCY MEDICINE

## 2025-07-17 PROCEDURE — 83690 ASSAY OF LIPASE: CPT | Performed by: EMERGENCY MEDICINE

## 2025-07-17 PROCEDURE — 99222 1ST HOSP IP/OBS MODERATE 55: CPT | Performed by: HOSPITALIST

## 2025-07-17 PROCEDURE — 85025 COMPLETE CBC W/AUTO DIFF WBC: CPT | Performed by: EMERGENCY MEDICINE

## 2025-07-17 PROCEDURE — 99285 EMERGENCY DEPT VISIT HI MDM: CPT | Mod: 25 | Performed by: EMERGENCY MEDICINE

## 2025-07-17 PROCEDURE — 250N000013 HC RX MED GY IP 250 OP 250 PS 637: Performed by: HOSPITALIST

## 2025-07-17 PROCEDURE — 36415 COLL VENOUS BLD VENIPUNCTURE: CPT | Performed by: EMERGENCY MEDICINE

## 2025-07-17 PROCEDURE — 96375 TX/PRO/DX INJ NEW DRUG ADDON: CPT

## 2025-07-17 PROCEDURE — 258N000003 HC RX IP 258 OP 636: Performed by: EMERGENCY MEDICINE

## 2025-07-17 PROCEDURE — 258N000003 HC RX IP 258 OP 636: Performed by: HOSPITALIST

## 2025-07-17 RX ORDER — OXYCODONE HYDROCHLORIDE 5 MG/1
5 TABLET ORAL EVERY 6 HOURS PRN
Status: DISCONTINUED | OUTPATIENT
Start: 2025-07-17 | End: 2025-07-18

## 2025-07-17 RX ORDER — ONDANSETRON 2 MG/ML
4 INJECTION INTRAMUSCULAR; INTRAVENOUS
Status: DISCONTINUED | OUTPATIENT
Start: 2025-07-17 | End: 2025-07-17

## 2025-07-17 RX ORDER — CEFEPIME HYDROCHLORIDE 2 G/1
2 INJECTION, POWDER, FOR SOLUTION INTRAVENOUS ONCE
Status: COMPLETED | OUTPATIENT
Start: 2025-07-17 | End: 2025-07-17

## 2025-07-17 RX ORDER — HYDROMORPHONE HCL IN WATER/PF 6 MG/30 ML
0.2 PATIENT CONTROLLED ANALGESIA SYRINGE INTRAVENOUS EVERY 4 HOURS PRN
Status: DISCONTINUED | OUTPATIENT
Start: 2025-07-17 | End: 2025-07-21 | Stop reason: HOSPADM

## 2025-07-17 RX ORDER — NALOXONE HYDROCHLORIDE 0.4 MG/ML
0.2 INJECTION, SOLUTION INTRAMUSCULAR; INTRAVENOUS; SUBCUTANEOUS
Status: DISCONTINUED | OUTPATIENT
Start: 2025-07-17 | End: 2025-07-21 | Stop reason: HOSPADM

## 2025-07-17 RX ORDER — NAPROXEN SODIUM 220 MG/1
220 TABLET, FILM COATED ORAL 2 TIMES DAILY PRN
COMMUNITY

## 2025-07-17 RX ORDER — HYDROMORPHONE HYDROCHLORIDE 1 MG/ML
0.5 INJECTION, SOLUTION INTRAMUSCULAR; INTRAVENOUS; SUBCUTANEOUS
Refills: 0 | Status: DISCONTINUED | OUTPATIENT
Start: 2025-07-17 | End: 2025-07-17

## 2025-07-17 RX ORDER — SODIUM CHLORIDE 9 MG/ML
INJECTION, SOLUTION INTRAVENOUS CONTINUOUS
Status: ACTIVE | OUTPATIENT
Start: 2025-07-17 | End: 2025-07-18

## 2025-07-17 RX ORDER — ACETAMINOPHEN 650 MG/1
650 SUPPOSITORY RECTAL EVERY 4 HOURS PRN
Status: DISCONTINUED | OUTPATIENT
Start: 2025-07-17 | End: 2025-07-21 | Stop reason: HOSPADM

## 2025-07-17 RX ORDER — ACETAMINOPHEN 325 MG/1
650 TABLET ORAL EVERY 4 HOURS PRN
Status: DISCONTINUED | OUTPATIENT
Start: 2025-07-17 | End: 2025-07-21 | Stop reason: HOSPADM

## 2025-07-17 RX ORDER — NALOXONE HYDROCHLORIDE 0.4 MG/ML
0.4 INJECTION, SOLUTION INTRAMUSCULAR; INTRAVENOUS; SUBCUTANEOUS
Status: DISCONTINUED | OUTPATIENT
Start: 2025-07-17 | End: 2025-07-21 | Stop reason: HOSPADM

## 2025-07-17 RX ORDER — AMOXICILLIN 250 MG
1 CAPSULE ORAL 2 TIMES DAILY PRN
Status: DISCONTINUED | OUTPATIENT
Start: 2025-07-17 | End: 2025-07-21 | Stop reason: HOSPADM

## 2025-07-17 RX ORDER — IOPAMIDOL 755 MG/ML
60 INJECTION, SOLUTION INTRAVASCULAR ONCE
Status: COMPLETED | OUTPATIENT
Start: 2025-07-17 | End: 2025-07-17

## 2025-07-17 RX ORDER — LIDOCAINE 40 MG/G
CREAM TOPICAL
Status: DISCONTINUED | OUTPATIENT
Start: 2025-07-17 | End: 2025-07-21 | Stop reason: HOSPADM

## 2025-07-17 RX ORDER — MEROPENEM 500 MG/1
500 INJECTION, POWDER, FOR SOLUTION INTRAVENOUS EVERY 6 HOURS
Status: DISCONTINUED | OUTPATIENT
Start: 2025-07-17 | End: 2025-07-21 | Stop reason: HOSPADM

## 2025-07-17 RX ORDER — METRONIDAZOLE 500 MG/100ML
500 INJECTION, SOLUTION INTRAVENOUS ONCE
Status: COMPLETED | OUTPATIENT
Start: 2025-07-17 | End: 2025-07-18

## 2025-07-17 RX ORDER — ONDANSETRON 2 MG/ML
4 INJECTION INTRAMUSCULAR; INTRAVENOUS EVERY 6 HOURS PRN
Status: DISCONTINUED | OUTPATIENT
Start: 2025-07-17 | End: 2025-07-21 | Stop reason: HOSPADM

## 2025-07-17 RX ORDER — ONDANSETRON 4 MG/1
4 TABLET, ORALLY DISINTEGRATING ORAL EVERY 6 HOURS PRN
Status: DISCONTINUED | OUTPATIENT
Start: 2025-07-17 | End: 2025-07-21 | Stop reason: HOSPADM

## 2025-07-17 RX ORDER — HYDROMORPHONE HCL IN WATER/PF 6 MG/30 ML
0.4 PATIENT CONTROLLED ANALGESIA SYRINGE INTRAVENOUS EVERY 4 HOURS PRN
Status: DISCONTINUED | OUTPATIENT
Start: 2025-07-17 | End: 2025-07-21 | Stop reason: HOSPADM

## 2025-07-17 RX ORDER — KETOROLAC TROMETHAMINE 15 MG/ML
15 INJECTION, SOLUTION INTRAMUSCULAR; INTRAVENOUS ONCE
Status: COMPLETED | OUTPATIENT
Start: 2025-07-17 | End: 2025-07-17

## 2025-07-17 RX ORDER — AMOXICILLIN 250 MG
2 CAPSULE ORAL 2 TIMES DAILY PRN
Status: DISCONTINUED | OUTPATIENT
Start: 2025-07-17 | End: 2025-07-21 | Stop reason: HOSPADM

## 2025-07-17 RX ORDER — CALCIUM CARBONATE 500 MG/1
1000 TABLET, CHEWABLE ORAL 4 TIMES DAILY PRN
Status: DISCONTINUED | OUTPATIENT
Start: 2025-07-17 | End: 2025-07-21 | Stop reason: HOSPADM

## 2025-07-17 RX ADMIN — ONDANSETRON 4 MG: 2 INJECTION, SOLUTION INTRAMUSCULAR; INTRAVENOUS at 22:57

## 2025-07-17 RX ADMIN — OXYCODONE HYDROCHLORIDE 5 MG: 5 TABLET ORAL at 22:57

## 2025-07-17 RX ADMIN — ONDANSETRON 4 MG: 2 INJECTION, SOLUTION INTRAMUSCULAR; INTRAVENOUS at 15:24

## 2025-07-17 RX ADMIN — METRONIDAZOLE 500 MG: 500 INJECTION, SOLUTION INTRAVENOUS at 23:54

## 2025-07-17 RX ADMIN — SODIUM CHLORIDE: 9 INJECTION, SOLUTION INTRAVENOUS at 23:03

## 2025-07-17 RX ADMIN — IOPAMIDOL 60 ML: 755 INJECTION, SOLUTION INTRAVENOUS at 16:23

## 2025-07-17 RX ADMIN — KETOROLAC TROMETHAMINE 15 MG: 15 INJECTION, SOLUTION INTRAMUSCULAR; INTRAVENOUS at 15:24

## 2025-07-17 RX ADMIN — SODIUM CHLORIDE 1000 ML: 0.9 INJECTION, SOLUTION INTRAVENOUS at 15:23

## 2025-07-17 RX ADMIN — CEFEPIME 2 G: 2 INJECTION, POWDER, FOR SOLUTION INTRAVENOUS at 20:39

## 2025-07-17 RX ADMIN — SODIUM CHLORIDE 60 ML: 9 INJECTION, SOLUTION INTRAVENOUS at 16:23

## 2025-07-17 ASSESSMENT — ACTIVITIES OF DAILY LIVING (ADL)
ADLS_ACUITY_SCORE: 41

## 2025-07-17 NOTE — ED PROVIDER NOTES
"  Emergency Department Note      History of Present Illness     Chief Complaint:  Abdominal pain    HPI   Denia Raymond is a 40 year old female with recent diagnosis of diverticulitis who completed her course of oral ciprofloxacin and metronidazole who presents emergency department for evaluation of ongoing lower abdominal discomfort primarily the left lower abdomen, she states the pain had been \"ebbing and flowing\" since her discharge from the emergency department on July 6, but became more intense yesterday evening.  It spread somewhat to the rest of her abdomen and is associated with nausea and she has had frequent and somewhat dark bowel movements without apparent blood.  No symptoms.  Her menstrual period started yesterday, she speculates that this might be contributing to her symptoms, this is normal timing for her menstrual period.  No abdominal surgeries in the past.  She took some NSAID within the past day with incomplete leaf.  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  She would like to make sure that nothing more dangerous is going on.  She volunteers that she had popcorn yesterday and wonders whether this might have triggered a flare.  She volunteers that she is in recovery and does not want to receive any potentially addicting medicine.    Review of External Notes: I personally reviewed prior records including CT results from 7/6/25 showing mild uncomp sigmoid diverticulitis, also possible R arm of IUD in myometrium.     Past Medical History     Medical History and Problem List   Past Medical History:   Diagnosis Date    Depressive disorder     Urinary tract infection     Wounds and injuries        Medications   No current outpatient medications on file.    Surgical History   Past Surgical History:   Procedure Laterality Date    No Surgical history       Physical Exam     Patient Vitals for the past 24 hrs:   BP Temp Temp src Pulse Resp SpO2   07/17/25 2244 " 111/72 98.4  F (36.9  C) Oral 59 16 100 %   07/17/25 2025 118/77 97.8  F (36.6  C) Temporal 61 18 100 %   07/17/25 1438 -- 98.3  F (36.8  C) Oral -- -- --   07/17/25 1436 137/82 -- -- 75 19 100 %     Physical Exam  General: nontoxic appearing woman sitting upright in chair  HENT: mucous membranes moist   CV: rate as above, no murmur audible, no LE edema  Resp: normal effort, speaks in full phrases, no stridor, no cough observed  GI: abdomen soft, mild-moderate tenderness in lower abd farida in LLQ, no discrete masses palpable, no guarding, no focal tenderness over McBurney's, negative Taylor's  MSK: no bony tenderness, no CVAT  Skin: appropriately warm and dry, no abdominal rash, lower back tattoo present  Neuro: alert, clear speech, oriented   Psych: cooperative, pleasant    Diagnostics   Lab Results   Labs Ordered and Resulted from Time of ED Arrival to Time of ED Departure   COMPREHENSIVE METABOLIC PANEL - Abnormal       Result Value    Sodium 140      Potassium 3.4      Carbon Dioxide (CO2) 25      Anion Gap 11      Urea Nitrogen 6.2      Creatinine 0.60      GFR Estimate >90      Calcium 8.9      Chloride 104      Glucose 78      Alkaline Phosphatase 37 (*)     AST 12      ALT 11      Protein Total 6.7      Albumin 4.2      Bilirubin Total 0.4     ROUTINE UA WITH MICROSCOPIC - Normal    Color Urine Straw      Appearance Urine Clear      Glucose Urine Negative      Bilirubin Urine Negative      Ketones Urine Negative      Specific Gravity Urine 1.003      Blood Urine Negative      pH Urine 7.0      Protein Albumin Urine Negative      Urobilinogen Urine Normal      Nitrite Urine Negative      Leukocyte Esterase Urine Negative      RBC Urine 0      WBC Urine <1      Squamous Epithelials Urine <1     HCG QUALITATIVE PREGNANCY - Normal    hCG Serum Qualitative Negative     LIPASE - Normal    Lipase 34     LACTIC ACID WHOLE BLOOD WITH 1X REPEAT IN 2 HR WHEN >2 - Normal    Lactic Acid, Initial 0.7     CBC WITH PLATELETS  AND DIFFERENTIAL    WBC Count 9.5      RBC Count 4.27      Hemoglobin 13.1      Hematocrit 38.8      MCV 91      MCH 30.7      MCHC 33.8      RDW 12.8      Platelet Count 309      % Neutrophils 68      % Lymphocytes 23      % Monocytes 6      % Eosinophils 2      % Basophils 1      % Immature Granulocytes 0      NRBCs per 100 WBC 0      Absolute Neutrophils 6.4      Absolute Lymphocytes 2.2      Absolute Monocytes 0.6      Absolute Eosinophils 0.2      Absolute Basophils 0.1      Absolute Immature Granulocytes 0.0      Absolute NRBCs 0.0     BLOOD CULTURE   BLOOD CULTURE     Imaging   CT Abdomen Pelvis w Contrast   Final Result   IMPRESSION:    1.  Persistent mild diverticulitis of the proximal sigmoid colon. Questionable small developing intramural abscess within the inflamed colon. No drainable fluid collection and no free air.        ED Course      Medications Administered   Medications   metroNIDAZOLE (FLAGYL) infusion 500 mg (has no administration in time range)   lidocaine 1 % 0.1-1 mL (has no administration in time range)   lidocaine (LMX4) cream (has no administration in time range)   sodium chloride (PF) 0.9% PF flush 3 mL (has no administration in time range)   sodium chloride (PF) 0.9% PF flush 3 mL (has no administration in time range)   senna-docusate (SENOKOT-S/PERICOLACE) 8.6-50 MG per tablet 1 tablet (has no administration in time range)     Or   senna-docusate (SENOKOT-S/PERICOLACE) 8.6-50 MG per tablet 2 tablet (has no administration in time range)   calcium carbonate (TUMS) chewable tablet 1,000 mg (has no administration in time range)   meropenem (MERREM) 500 mg vial to attach to  mL bag for ADULTS or 25 mL bag for PEDS (has no administration in time range)   sodium chloride 0.9 % infusion ( Intravenous $New Bag 7/17/25 4412)   acetaminophen (TYLENOL) tablet 650 mg (has no administration in time range)     Or   acetaminophen (TYLENOL) Suppository 650 mg (has no administration in time range)    oxyCODONE IR (ROXICODONE) half-tab 2.5 mg (has no administration in time range)   oxyCODONE (ROXICODONE) tablet 5 mg (5 mg Oral $Given 7/17/25 2257)   HYDROmorphone (DILAUDID) injection 0.2 mg (has no administration in time range)   HYDROmorphone (DILAUDID) injection 0.4 mg (has no administration in time range)   ondansetron (ZOFRAN) injection 4 mg (4 mg Intravenous $Given 7/17/25 2257)     Or   ondansetron (ZOFRAN ODT) ODT tab 4 mg ( Oral See Alternative 7/17/25 2257)   naloxone (NARCAN) injection 0.2 mg (has no administration in time range)     Or   naloxone (NARCAN) injection 0.4 mg (has no administration in time range)     Or   naloxone (NARCAN) injection 0.2 mg (has no administration in time range)     Or   naloxone (NARCAN) injection 0.4 mg (has no administration in time range)   ketorolac (TORADOL) injection 15 mg (15 mg Intravenous $Given 7/17/25 1524)   sodium chloride 0.9% BOLUS 1,000 mL (1,000 mLs Intravenous $New Bag 7/17/25 1523)   sodium chloride 0.9 % bag for CT scan flush (60 mLs Intravenous $Given 7/17/25 1623)   iopamidol (ISOVUE-370) solution 60 mL (60 mLs Intravenous $Given 7/17/25 1623)   ceFEPIme (MAXIPIME) 2 g vial to attach to  mL bag for ADULTS or NS 50 mL bag for PEDS (0 g Intravenous Stopped 7/17/25 2124)       ED Course and Discussion of Management   ED Course as of 07/17/25 2342   Thu Jul 17, 2025   1729 Time zero for possible sepsis.   1729 I rechecked patient, discussed test results incl possible intraabd abscess.  Spoke with RN and charge RN regarding admission.   1739 I consulted with ED clinical pharmacist regarding antibiotics.   1755 I spoke with Dr. Craig, Hospitalist, who accepts care.       Additional Documentation  None    MIPS   None             Medical Decision Making / Diagnosis   Medical Decision Making:  Patient presents with concern for worsening of her abdominal pain in the setting of completing her oral antibiotics for recently diagnosed diverticulitis which  was uncomplicated at the time of her last visit.  Unfortunately, CT shows evidence of possible small intra-abdominal abscess, and given ongoing symptoms and worsening of her imaging, I think she should be hospitalized for IV antibiotics and close monitoring and further care including urgent but not emergent colorectal surgery consultation.  Given the size of the abscess, it will not likely be amenable to percutaneous or intraoperative drainage but this will need to be considered along with ongoing care here in the hospital.  These findings and plan of care were discussed with the patient who is in agreement and ultimately excepted some pain medication here tonight.  Admitted to the hospital service.    Disposition   Admit    Diagnosis     ICD-10-CM    1. Intra-abdominal abscess (H)  K65.1       2. Diverticulitis  K57.92          7/17/2025   MD Kendra Wyatt, Avinash Holguin MD  07/17/25 9915

## 2025-07-17 NOTE — ED TRIAGE NOTES
Pt presents to ed to be evaluated for abdominal pain.   Pt was treated on 7/7 for diverticulitis, and given course of antibiotics. Sx improved, but recurred this am. Pt reports having cramping pain again at 5am. Denies vomiting, and denies diarrhea. Pt reports that she is currently menstruating.      Triage Assessment (Adult)       Row Name 07/17/25 1433          Triage Assessment    Airway WDL WDL        Respiratory WDL    Respiratory WDL WDL

## 2025-07-17 NOTE — H&P
[facility]    History and Physical - Hospitalist Service       Date of Admission:  7/17/2025    Assessment & Plan      Denia Raymond is a 40 year old female with depression and alcohol use history recent uncomplicated diverticulitis treated with antibiotic, presented to the ER due to abdominal pain, found to have acute sigmoid diverticulitis with possible abscess and admitted on 7/17/2025.      Acute sigmoid diverticulitis with possible abscess  Patient in ER 7/6/2025 with 4 days of LLQ abdominal pain.  CT scan of abdomen pelvis showed mild acute uncomplicated sigmoid diverticulitis.  Treated with 7 days of ciprofloxacin and Flagyl.  Symptom improved initially, and now worse.  CBC CMP lipase normal, beta-hCG negative.  CT abdomen pelvis now shows persistent mild diverticulitis of the proximal sigmoid colon with questionable developing intramural abscess.  No free air or drainable fluid.  - Cefepime and Flagyl given in ER given allergy.  Will treat with meropenem.  - Full liquid diet, IVF, pain medications, antiemetic ordered.  - Colorectal surgery consult  - nutrition consult to discuss diet  - Noted constipation based on initial CT with loose BM.  Bowel regimen when diet advanced.  - Once acute infection/inflammation subsides, needs follow-up for colonoscopy    H/o Depression   - used to be on sertraline, no longer more than a year.    H/o alcohol use  - Sober for 4+ years          Diet:  Full liquid   DVT Prophylaxis: Pneumatic Compression Devices/ambulate every shift  Maloney Catheter: Not present  Lines: None     Cardiac Monitoring: None  Code Status:  Full code     Clinically Significant Risk Factors Present on Admission                                        Disposition Plan     Medically Ready for Discharge: Anticipated in 2-4 Days           Murali Craig MD  Hospitalist Service  [facility]  Securely message with BlogBus (more info)  Text page via [employer]  Paging/Directory     ______________________________________________________________________    Chief Complaint   Abdominal pain     History is obtained from the patient, chart review and discussion with ER MD    History of Present Illness   Denia Raymond is a 40 year old female with depression and alcohol use history recent uncomplicated diverticulitis treated with antibiotic, presented to the ER due to abdominal pain      Patient was in ER 7/6/2025 with 4 days of LLQ abdominal pain.  CT scan of abdomen pelvis showed mild acute uncomplicated sigmoid diverticulitis.  Treated with 7 days of ciprofloxacin and Flagyl.  She reports compliance with the medication.  Symptom improved initially, and was pain-free with episodes of mild pain/tenderness.  Now with lower abdominal pain, was worse last night when she was working, around 3 AM.  She felt diaphoretic and coworker noticed her face was flushed, had chills, but did not check temperature.  Has ongoing nausea but no vomiting.  She has 2-3 loose bowel movements, dark, currently having.  Show not sure if any blood in the stool..  Pain is throughout her lower abdomen, where is it was localized more on the left lower quadrant when it started 10 days ago.  She feels tired.    CBC CMP lipase normal, beta-hCG negative.  CT abdomen pelvis now shows persistent mild diverticulitis of the proximal sigmoid colon with questionable developing intramural abscess.  No free air or drainable fluid. IV cefepime and flagyl given and hospitalist contacted for admission.       Past Medical History    Past Medical History:   Diagnosis Date    Depressive disorder     Urinary tract infection     Wounds and injuries        Past Surgical History   Past Surgical History:   Procedure Laterality Date    No Surgical history         Prior to Admission Medications   Prior to Admission Medications   Prescriptions Last Dose Informant Patient Reported? Taking?   ciprofloxacin (CIPRO) 500 MG tablet   No  No   Sig: Take 1 tablet (500 mg) by mouth 2 times daily.   docusate sodium (COLACE) 100 MG capsule   No No   Sig: Take 1 capsule (100 mg) by mouth 2 times daily.   hydrOXYzine (ATARAX) 25 MG tablet   No No   Sig: Take 1 tablet (25 mg) by mouth 3 times daily as needed for anxiety   metroNIDAZOLE (FLAGYL) 500 MG tablet   No No   Sig: Take 1 tablet (500 mg) by mouth 2 times daily.   naltrexone (DEPADE/REVIA) 50 MG tablet   No No   Sig: Take 1 tablet (50 mg) by mouth daily   ondansetron (ZOFRAN ODT) 4 MG ODT tab   No No   Sig: Take 1 tablet (4 mg) by mouth every 8 hours as needed for nausea.   sertraline (ZOLOFT) 100 MG tablet   No No   Sig: Take 1 tablet (100 mg) by mouth daily      Facility-Administered Medications: None        Review of Systems    The 10 point Review of Systems is negative other than noted in the HPI or here.      Social History   I have reviewed this patient's social history and updated it with pertinent information if needed.  Social History     Tobacco Use    Smoking status: Former     Current packs/day: 0.00     Types: Cigarettes     Quit date: 2017     Years since quittin.0    Smokeless tobacco: Never   Vaping Use    Vaping status: Never Used   Substance Use Topics    Alcohol use: Not Currently     Comment: ocasional    Drug use: Not Currently     Types: Cocaine         Family History   I have reviewed this patient's family history and updated it with pertinent information if needed.  Family History   Problem Relation Age of Onset    Anxiety Disorder Mother     Diabetes Father     Dementia Paternal Grandmother     Dementia Paternal Grandfather          Allergies   Allergies   Allergen Reactions    Penicillins Rash     rash        Physical Exam   Vital Signs: Temp: 98.3  F (36.8  C) Temp src: Oral BP: 137/82 Pulse: 75   Resp: 19 SpO2: 100 %      Weight: 0 lbs 0 oz    General: AAOx3, appears comfortable.  HEENT: PERRLA EOMI. Mucosa moist.   Lungs: Bilateral equal air entry. Clear to  auscultation, normal work of breathing.   CVS: S1S2 regular, no tachycardia or murmur.   Abdomen: Soft, nondistended, suprapubic and posterior lower quadrants tender, no guarding or rigidity. BS heard.  MSK: No edema or deformities.  Neuro: AAOX3. CN 2-12 normal. Strength symmetrical.  Skin: No rash.       Medical Decision Making       70 MINUTES SPENT BY ME on the date of service doing chart review, history, exam, documentation & further activities per the note.      Data     I have personally reviewed the following data over the past 24 hrs:    9.5  \   13.1   / 309     140 104 6.2 /  78   3.4 25 0.60 \     ALT: 11 AST: 12 AP: 37 (L) TBILI: 0.4   ALB: 4.2 TOT PROTEIN: 6.7 LIPASE: 34       Imaging results reviewed over the past 24 hrs:   Recent Results (from the past 24 hours)   CT Abdomen Pelvis w Contrast    Narrative    EXAM: CT ABDOMEN PELVIS W CONTRAST  LOCATION: [facility]  DATE: 7/17/2025    INDICATION: recurrent lower abd pain, recent dx diverticulitis, no urinary symptoms  COMPARISON: CT 7/6/2025  TECHNIQUE: CT scan of the abdomen and pelvis was performed following injection of IV contrast. Multiplanar reformats were obtained. Dose reduction techniques were used.  CONTRAST: 60 mL Isovue 370    FINDINGS:   LOWER CHEST: Normal.    HEPATOBILIARY: 9 mm hypodense liver dome lesion may represent a cyst or hemangioma. Normal gallbladder and bile ducts.    PANCREAS: Normal.    SPLEEN: Normal.    ADRENAL GLANDS: Normal.    KIDNEYS/BLADDER: Normal.    BOWEL: Distal colonic diverticulosis. Persistent mild inflammatory changes of the proximal sigmoid colon. Questionable small developing intramural abscess measuring 1.0 x 0.9 x 0.7 cm, image numbers 133 series 4 and 26 series 5. No free air.    LYMPH NODES: Normal.    VASCULATURE: Normal.    PELVIC ORGANS: IUD. Intravaginal tampon.    MUSCULOSKELETAL: Normal.      Impression    IMPRESSION:   1.  Persistent mild diverticulitis of the proximal  sigmoid colon. Questionable small developing intramural abscess within the inflamed colon. No drainable fluid collection and no free air.

## 2025-07-18 LAB
ANION GAP SERPL CALCULATED.3IONS-SCNC: 6 MMOL/L (ref 7–15)
BUN SERPL-MCNC: 4.3 MG/DL (ref 6–20)
CALCIUM SERPL-MCNC: 8 MG/DL (ref 8.8–10.4)
CHLORIDE SERPL-SCNC: 109 MMOL/L (ref 98–107)
CREAT SERPL-MCNC: 0.59 MG/DL (ref 0.51–0.95)
EGFRCR SERPLBLD CKD-EPI 2021: >90 ML/MIN/1.73M2
ERYTHROCYTE [DISTWIDTH] IN BLOOD BY AUTOMATED COUNT: 12.8 % (ref 10–15)
GLUCOSE SERPL-MCNC: 84 MG/DL (ref 70–99)
HCO3 SERPL-SCNC: 23 MMOL/L (ref 22–29)
HCT VFR BLD AUTO: 32.8 % (ref 35–47)
HGB BLD-MCNC: 11.1 G/DL (ref 11.7–15.7)
MCH RBC QN AUTO: 30.9 PG (ref 26.5–33)
MCHC RBC AUTO-ENTMCNC: 33.8 G/DL (ref 31.5–36.5)
MCV RBC AUTO: 91 FL (ref 78–100)
PLATELET # BLD AUTO: 243 10E3/UL (ref 150–450)
POTASSIUM SERPL-SCNC: 4.3 MMOL/L (ref 3.4–5.3)
RBC # BLD AUTO: 3.59 10E6/UL (ref 3.8–5.2)
SODIUM SERPL-SCNC: 138 MMOL/L (ref 135–145)
WBC # BLD AUTO: 7.2 10E3/UL (ref 4–11)

## 2025-07-18 PROCEDURE — 99207 PR APP CREDIT; MD BILLING SHARED VISIT: CPT | Mod: FS

## 2025-07-18 PROCEDURE — 250N000013 HC RX MED GY IP 250 OP 250 PS 637: Performed by: HOSPITALIST

## 2025-07-18 PROCEDURE — 250N000011 HC RX IP 250 OP 636: Performed by: HOSPITALIST

## 2025-07-18 PROCEDURE — 120N000001 HC R&B MED SURG/OB

## 2025-07-18 PROCEDURE — 250N000011 HC RX IP 250 OP 636: Performed by: COLON & RECTAL SURGERY

## 2025-07-18 PROCEDURE — 85018 HEMOGLOBIN: CPT | Performed by: HOSPITALIST

## 2025-07-18 PROCEDURE — 99222 1ST HOSP IP/OBS MODERATE 55: CPT | Mod: FS | Performed by: COLON & RECTAL SURGERY

## 2025-07-18 PROCEDURE — 99232 SBSQ HOSP IP/OBS MODERATE 35: CPT | Performed by: HOSPITALIST

## 2025-07-18 PROCEDURE — 80048 BASIC METABOLIC PNL TOTAL CA: CPT | Performed by: HOSPITALIST

## 2025-07-18 PROCEDURE — 258N000003 HC RX IP 258 OP 636: Performed by: HOSPITALIST

## 2025-07-18 PROCEDURE — 36415 COLL VENOUS BLD VENIPUNCTURE: CPT | Performed by: HOSPITALIST

## 2025-07-18 RX ORDER — KETOROLAC TROMETHAMINE 15 MG/ML
15 INJECTION, SOLUTION INTRAMUSCULAR; INTRAVENOUS EVERY 6 HOURS PRN
Status: DISCONTINUED | OUTPATIENT
Start: 2025-07-18 | End: 2025-07-21 | Stop reason: HOSPADM

## 2025-07-18 RX ORDER — OXYCODONE HYDROCHLORIDE 5 MG/1
10 TABLET ORAL EVERY 6 HOURS PRN
Refills: 0 | Status: DISCONTINUED | OUTPATIENT
Start: 2025-07-18 | End: 2025-07-21

## 2025-07-18 RX ORDER — POTASSIUM CHLORIDE 1500 MG/1
20 TABLET, EXTENDED RELEASE ORAL ONCE
Status: COMPLETED | OUTPATIENT
Start: 2025-07-18 | End: 2025-07-18

## 2025-07-18 RX ORDER — OXYCODONE HYDROCHLORIDE 5 MG/1
5 TABLET ORAL EVERY 6 HOURS PRN
Refills: 0 | Status: DISCONTINUED | OUTPATIENT
Start: 2025-07-18 | End: 2025-07-21

## 2025-07-18 RX ADMIN — ACETAMINOPHEN 650 MG: 325 TABLET ORAL at 09:19

## 2025-07-18 RX ADMIN — ONDANSETRON 4 MG: 4 TABLET, ORALLY DISINTEGRATING ORAL at 09:19

## 2025-07-18 RX ADMIN — POTASSIUM CHLORIDE 20 MEQ: 1500 TABLET, EXTENDED RELEASE ORAL at 01:16

## 2025-07-18 RX ADMIN — KETOROLAC TROMETHAMINE 15 MG: 15 INJECTION, SOLUTION INTRAMUSCULAR; INTRAVENOUS at 18:22

## 2025-07-18 RX ADMIN — HYDROMORPHONE HYDROCHLORIDE 0.4 MG: 0.2 INJECTION, SOLUTION INTRAMUSCULAR; INTRAVENOUS; SUBCUTANEOUS at 20:21

## 2025-07-18 RX ADMIN — MEROPENEM 500 MG: 500 INJECTION, POWDER, FOR SOLUTION INTRAVENOUS at 12:58

## 2025-07-18 RX ADMIN — FAMOTIDINE 20 MG: 10 INJECTION, SOLUTION INTRAVENOUS at 18:22

## 2025-07-18 RX ADMIN — SODIUM CHLORIDE: 9 INJECTION, SOLUTION INTRAVENOUS at 10:36

## 2025-07-18 RX ADMIN — MEROPENEM 500 MG: 500 INJECTION, POWDER, FOR SOLUTION INTRAVENOUS at 18:22

## 2025-07-18 RX ADMIN — HYDROMORPHONE HYDROCHLORIDE 0.4 MG: 0.2 INJECTION, SOLUTION INTRAMUSCULAR; INTRAVENOUS; SUBCUTANEOUS at 14:43

## 2025-07-18 RX ADMIN — MEROPENEM 500 MG: 500 INJECTION, POWDER, FOR SOLUTION INTRAVENOUS at 01:16

## 2025-07-18 RX ADMIN — HYDROMORPHONE HYDROCHLORIDE 0.2 MG: 0.2 INJECTION, SOLUTION INTRAMUSCULAR; INTRAVENOUS; SUBCUTANEOUS at 06:16

## 2025-07-18 RX ADMIN — MEROPENEM 500 MG: 500 INJECTION, POWDER, FOR SOLUTION INTRAVENOUS at 06:17

## 2025-07-18 RX ADMIN — OXYCODONE HYDROCHLORIDE 10 MG: 5 TABLET ORAL at 23:45

## 2025-07-18 RX ADMIN — ACETAMINOPHEN 650 MG: 325 TABLET ORAL at 23:42

## 2025-07-18 RX ADMIN — HYDROMORPHONE HYDROCHLORIDE 0.4 MG: 0.2 INJECTION, SOLUTION INTRAMUSCULAR; INTRAVENOUS; SUBCUTANEOUS at 10:36

## 2025-07-18 ASSESSMENT — ACTIVITIES OF DAILY LIVING (ADL)
ADLS_ACUITY_SCORE: 24
ADLS_ACUITY_SCORE: 15
ADLS_ACUITY_SCORE: 24
ADLS_ACUITY_SCORE: 24
ADLS_ACUITY_SCORE: 15
ADLS_ACUITY_SCORE: 24
ADLS_ACUITY_SCORE: 24
ADLS_ACUITY_SCORE: 15
ADLS_ACUITY_SCORE: 24
ADLS_ACUITY_SCORE: 15
ADLS_ACUITY_SCORE: 24
ADLS_ACUITY_SCORE: 15
ADLS_ACUITY_SCORE: 24
ADLS_ACUITY_SCORE: 15
ADLS_ACUITY_SCORE: 24
ADLS_ACUITY_SCORE: 24
ADLS_ACUITY_SCORE: 20

## 2025-07-18 NOTE — PLAN OF CARE
[id number]  Orientation: a&ox4  Aggression Stop Light: green  Activity: ind  Diet/BS Checks: full liquid. Good appetite   Tele:  n/a  IV Access/Drains: R PIV infusing  mL/hr  Pain Management: c/o of lower quadrant abd pain/cramping oxy 5 mg x1. IV dilaudid 0.2 mg x1  Abnormal VS/Results: VSS on Ra  -K protocol- replaced x1- recheck in tmrw AM  -Hgb 11.1  Bowel/Bladder: cont B/B  Skin/Wounds: wnl  Consults: colorectal surgery  D/C Disposition: pending  Other Info:     -IV zofran given x1, with some relief  -iv compazine ordered if needed

## 2025-07-18 NOTE — PHARMACY-ADMISSION MEDICATION HISTORY
Pharmacist Admission Medication History    Admission medication history is complete. The information provided in this note is only as accurate as the sources available at the time of the update.    Information Source(s): Patient, Family member, and CareEverywhere/SureScripts via in-person    Pertinent Information:   Recent antimicrobials:  On 7/7/25, patient was prescribed ciprofloxacin and metronidazole for 7 days for diverticulitis. Patient completed the course the course of antibiotics.     Changes made to PTA medication list:  Added: melatonin, magnesium, naproxen  Deleted: ciprofloxacin, metronidazole, hydroxyzine, docusate, naltrexone, sertraline  Changed: None    Allergies reviewed with patient and updates made in EHR: yes    Medication History Completed By: Frances Fisher RPH 7/17/2025 8:13 PM    PTA Med List   Medication Sig Last Dose/Taking    CALCIUM-MAGNESIUM-VITAMIN D PO Take 1-3 tablets by mouth nightly as needed. Taking As Needed    melatonin 3 MG tablet Take 3 mg by mouth nightly as needed for sleep. Taking As Needed    naproxen sodium (ANAPROX) 220 MG tablet Take 220 mg by mouth 2 times daily as needed for moderate pain. 7/17/2025 Morning    ondansetron (ZOFRAN ODT) 4 MG ODT tab Take 1 tablet (4 mg) by mouth every 8 hours as needed for nausea. Taking As Needed

## 2025-07-18 NOTE — PROVIDER NOTIFICATION
MD Notification    Notified Person: MD    Notified Person Name: Dr. Castellon    Notification Date/Time: 0155 7/18    Notification Interaction: vocera    Purpose of Notification:  pt still feeling nausea after IV zofran. Can we get IV compazine     Orders Received: placed an order request     Comments:

## 2025-07-18 NOTE — PLAN OF CARE
Goal Outcome Evaluation:       0700-1930  Orientation: A&O x4   Aggression Stop Light: green   Activity: ind   Diet/BS Checks: full liquid- tolerating well. Good appetite.   Tele:  n/a   IV Access/Drains: R PIV infusing NS at 75 mL/hr. Good blood return.   Pain Management: PRN dilaudid given x2 for 8/10 abdominal pain. PRN toradol given x1 for headache.   Abnormal VS/Results: VSS on RA. On K+ protocols- AM recheck.   Bowel/Bladder: continent- 1 loose BM today per patient report.   Skin/Wounds: intact   Consults: CRS  D/C Disposition: pending   Other Info:

## 2025-07-18 NOTE — ED NOTES
[facility]  ED Nurse Handoff Report    ED Chief complaint: Abdominal Pain      ED Diagnosis:   Final diagnoses:   Intra-abdominal abscess (H)   Diverticulitis       Code Status: Full Code    Allergies:   Allergies   Allergen Reactions    Penicillins Rash     rash       Patient Story: Denia Raymond is a 40 year old female with depression and alcohol use history recent uncomplicated diverticulitis treated with antibiotic, presented to the ER due to abdominal pain, found to have acute sigmoid diverticulitis with possible abscess and admitted on 7/17/2025.   Denia Raymond is a 40 year old female with depression and alcohol use history recent uncomplicated diverticulitis treated with antibiotic, presented to the ER due to abdominal pain      Patient was in ER 7/6/2025 with 4 days of LLQ abdominal pain.  CT scan of abdomen pelvis showed mild acute uncomplicated sigmoid diverticulitis.  Treated with 7 days of ciprofloxacin and Flagyl.  She reports compliance with the medication.  Symptom improved initially, and was pain-free with episodes of mild pain/tenderness.  Now with lower abdominal pain, was worse last night when she was working, around 3 AM.  She felt diaphoretic and coworker noticed her face was flushed, had chills, but did not check temperature.  Has ongoing nausea but no vomiting.  She has 2-3 loose bowel movements, dark, currently having.  Show not sure if any blood in the stool..  Pain is throughout her lower abdomen, where is it was localized more on the left lower quadrant when it started 10 days ago.  She feels tired.     Focused Assessment:  Vital Signs: Temp: 98.3  F (36.8  C) Temp src: Oral BP: 137/82 Pulse: 75   Resp: 19 SpO2: 100 %      Weight: 0 lbs 0 oz     General: AAOx3, appears comfortable.  HEENT: PERRLA EOMI. Mucosa moist.   Lungs: Bilateral equal air entry. Clear to auscultation, normal work of breathing.   CVS: S1S2 regular, no tachycardia or murmur.   Abdomen: Soft,  nondistended, suprapubic and posterior lower quadrants tender, no guarding or rigidity. BS heard.  MSK: No edema or deformities.  Neuro: AAOX3. CN 2-12 normal. Strength symmetrical.  Skin: No rash.   CBC CMP lipase normal, beta-hCG negative. CT abdomen pelvis now shows persistent mild diverticulitis of the proximal sigmoid colon with questionable developing intramural abscess. No free air or drainable fluid.        Treatments and/or interventions provided: IV cefepime and flagyl given and hospitalist contacted for admission   Patient's response to treatments and/or interventions: unchanged    To be done/followed up on inpatient unit:  see orders    Does this patient have any cognitive concerns?: n/a    Activity level - Baseline/Home:  Independent  Activity Level - Current:   Stand with Assist    Patient's Preferred language: English   Needed?: No    Isolation: None  Infection: Not Applicable  Sepsis treatment initiated: No  Patient tested for COVID 19 prior to admission: NO  Bariatric?: No    Vital Signs:   Vitals:    07/17/25 1436 07/17/25 1438 07/17/25 2025   BP: 137/82  118/77   Pulse: 75  61   Resp: 19  18   Temp:  98.3  F (36.8  C) 97.8  F (36.6  C)   TempSrc:  Oral Temporal   SpO2: 100%  100%       Cardiac Rhythm:     Was the PSS-3 completed:   Yes  What interventions are required if any?               Family Comments: n/a  OBS brochure/video discussed/provided to patient/family: N/A              Name of person given brochure if not patient: n/a              Relationship to patient: n/a    For the majority of the shift this patient's behavior was Green.   Behavioral interventions performed were n/a.    ED NURSE PHONE NUMBER: [phone]

## 2025-07-18 NOTE — CONSULTS
[facility]  Colon and Rectal Surgery Consult Note  Name: Denia Raymond    MRN: [id number]  YOB: 1985    Age: 40 year old  Date of admission: 7/17/2025  Primary care provider: Avinash Dyer     Requesting Physician:  Dr. Ziegler  Reason for consult:  diverticulitis with suspected abscess after course of abx completed           History of Present Illness:   Denia Raymond is a 40 year old female, seen at the request of Dr. Ziegler, who presents with abdominal pain and acute diverticulitis following an outpatient course of antibiotics.  She had initially presented to the ER on 7/6/2025 with left lower quadrant abdominal pain and CT at that time demonstrated mild, acute uncomplicated sigmoid diverticulitis.  She received 7 days of ciprofloxacin and Flagyl.  Throughout this time, she had mild abdominal pain and intermittent nausea.  Over the last couple of days, her abdominal pain has worsened and has become a bit more diffuse.  She denies any previous history of diverticulitis. Bowel movements have been overall normal. Denies rectal bleeding. She did not go on a liquid diet during this time and fears that this worsened her condition    In the ED, she was vitally stable and afebrile.  Abdominal exam demonstrated moderate tenderness in the lower abdomen.  Labs were overall unremarkable and WBC 9.5.  Lactic acid 0.7.  CT abdomen pelvis with contrast demonstrated persistent mild diverticulitis of proximal sigmoid colon with a possible developing intramural abscess but no drainable fluid collection or free air.  There is also a moderate stool burden. The patient was given IV fluids and initiated on IV antibiotics.  She was admitted to the hospitalist and CRS was thus consulted.    Today, the patient reports a mild improvement in pain but is still quite uncomfortable.  She continues to have intermittent nausea but no vomiting.  She denies any family history of colon or rectal  "cancer or IBD.        Colonoscopy History:  none    Past abdominal surgery: none            Past Medical History:     Past Medical History:   Diagnosis Date    Depressive disorder     Urinary tract infection     Wounds and injuries              Past Surgical History:     Past Surgical History:   Procedure Laterality Date    No Surgical history                 Social History:     Social History     Tobacco Use    Smoking status: Former     Current packs/day: 0.00     Types: Cigarettes     Quit date: 2017     Years since quittin.0    Smokeless tobacco: Never   Substance Use Topics    Alcohol use: Not Currently     Comment: ocasional             Family History:     Family History   Problem Relation Age of Onset    Anxiety Disorder Mother     Diabetes Father     Dementia Paternal Grandmother     Dementia Paternal Grandfather              Allergies:     Allergies   Allergen Reactions    Penicillins Rash     rash             Medications:     Current Facility-Administered Medications   Medication Dose Route Frequency Provider Last Rate Last Admin    meropenem (MERREM) 500 mg vial to attach to  mL bag for ADULTS or 25 mL bag for PEDS  500 mg Intravenous Q6H Murali Craig MD   500 mg at 25 0617    sodium chloride (PF) 0.9% PF flush 3 mL  3 mL Intracatheter Q8H [facility] Murali Craig MD   3 mL at 25 5914             Review of Systems:   A comprehensive greater than 10 system review of systems was carried out.  Pertinent positives and negatives are noted above.  Otherwise negative for contributory info.            Physical Exam:     Blood pressure 111/72, pulse 59, temperature 98.4  F (36.9  C), temperature source Oral, resp. rate 16, height 1.6 m (5' 3\"), weight 49.9 kg (110 lb), SpO2 100%.    Intake/Output Summary (Last 24 hours) at 2025 0787  Last data filed at 2025 0616  Gross per 24 hour   Intake 580 ml   Output --   Net 580 ml     Exam:  General - Awake alert and oriented, appears " stated age  Pulm - Non-labored breathing with normal respiratory effort  CVS - reg rate and rhythm, no peripheral edema  Abd - Soft, tender in lower quadrants but particularly in LLQ, non-distended.  No guarding, rigidity or peritoneal signs.   Neuro - CN II-XII grossly intact  Musculoskeletal - extremities with no clubbing, cyanosis or edema; able to ambulate  Psych - responsive, alert, cooperative; oriented x3; appropriate mood and affect  External/skin - inspection reveals no rashes, lesions or ulcers, normal coloring             Data Reviewed:     Recent Results (from the past 24 hours)   CT Abdomen Pelvis w Contrast    Narrative    EXAM: CT ABDOMEN PELVIS W CONTRAST  LOCATION: [facility]  DATE: 7/17/2025    INDICATION: recurrent lower abd pain, recent dx diverticulitis, no urinary symptoms  COMPARISON: CT 7/6/2025  TECHNIQUE: CT scan of the abdomen and pelvis was performed following injection of IV contrast. Multiplanar reformats were obtained. Dose reduction techniques were used.  CONTRAST: 60 mL Isovue 370    FINDINGS:   LOWER CHEST: Normal.    HEPATOBILIARY: 9 mm hypodense liver dome lesion may represent a cyst or hemangioma. Normal gallbladder and bile ducts.    PANCREAS: Normal.    SPLEEN: Normal.    ADRENAL GLANDS: Normal.    KIDNEYS/BLADDER: Normal.    BOWEL: Distal colonic diverticulosis. Persistent mild inflammatory changes of the proximal sigmoid colon. Questionable small developing intramural abscess measuring 1.0 x 0.9 x 0.7 cm, image numbers 133 series 4 and 26 series 5. No free air.    LYMPH NODES: Normal.    VASCULATURE: Normal.    PELVIC ORGANS: IUD. Intravaginal tampon.    MUSCULOSKELETAL: Normal.      Impression    IMPRESSION:   1.  Persistent mild diverticulitis of the proximal sigmoid colon. Questionable small developing intramural abscess within the inflamed colon. No drainable fluid collection and no free air.       Recent Labs   Lab 07/18/25  0539 07/17/25  1522    WBC 7.2 9.5   HGB 11.1* 13.1   HCT 32.8* 38.8   MCV 91 91    309          Lab Results   Component Value Date     07/18/2025     07/17/2025     07/06/2025    Lab Results   Component Value Date    CHLORIDE 109 07/18/2025    CHLORIDE 104 07/17/2025    CHLORIDE 102 07/06/2025    Lab Results   Component Value Date    BUN 4.3 07/18/2025    BUN 6.2 07/17/2025    BUN 4.4 07/06/2025      Lab Results   Component Value Date    POTASSIUM 4.3 07/18/2025    POTASSIUM 3.4 07/17/2025    POTASSIUM 3.8 07/06/2025    Lab Results   Component Value Date    CO2 23 07/18/2025    CO2 25 07/17/2025    CO2 26 07/06/2025    Lab Results   Component Value Date    CR 0.59 07/18/2025    CR 0.60 07/17/2025    CR 0.55 07/06/2025            Assessment and Plan:   Denia Raymond is a 40 year old female who presented with abdominal pain after failing a course of antibiotics for uncomplicated diverticulitis.  She completed 7 days of ciprofloxacin and Flagyl but returned with worsening symptoms.  In the ED, she was vitally stable with no leukocytosis.  CT demonstrated persistent mild diverticulitis of sigmoid colon with a possible developing intramural abscess.  Today, the patient reports a slight improvement in pain but is tender in the left lower quadrant on exam.    Continue with conservative management of diverticulitis.  No acute surgical intervention indicated.  Continue with IV antibiotics, IV fluids, bowel rest, and serial abdominal exams.  Will put her on a liquid diet.  Though her current overall condition does not warrant emergent surgical intervention, we discussed that if her pain worsens or she becomes clinically unstable, she may need urgent surgery this admission.  This would entail open sigmoid colectomy with end colostomy or diverting loop ileostomy.  This to be temporary and be able to reversing 6 to 9 months.  If she does have worsening or persistent pain over the next couple days, it may be beneficial  to repeat a CT scan to evaluate intramural abscess.  Regardless, if she is able to recover and be discharged the next few days, she will benefit from a repeat CT in 2 to 3 weeks to assess resolution.  She will also need a colonoscopy in 6 to 8 weeks.  CRS will continue to follow.    Plan:  Admit to hospitalist  Surgery: No indication for urgent surgery at this time.  Diet: Full liquid diet  IV Fluids: Continue  Antibiotics: Continue, currently on IV meropenem  Medications: Per medicine  I&O s:  strict I&O s   Labs:   - Reviewed: Yes  - Ordered: none   Imaging:   - Dr. Castellon and myself have personally viewed: CT abd/pelvis  - Ordered:  none  Activity:  OOB, ambulate as able  DVT prophylaxis: SCD s  This plan has been discussed with Dr. Castellon    Patient specific identified risk factors considered as part of today s evaluation include: failed outpatient treatment for diverticulitis    Additional history obtained from patient.  Time spent on consultation: 45 minutes, greater than 50% spent on counseling and/or coordination of care.      Kailey Ponce PA-C  Physician Assistant    [facility]  [address] [place] [address]  [place] [address]  T: [phone]  F: [phone]

## 2025-07-18 NOTE — PROGRESS NOTES
RECEIVING UNIT ED HANDOFF REVIEW    ED Nurse Handoff Report was reviewed by: Kaity Stephens RN on July 17, 2025 at 9:56 PM

## 2025-07-18 NOTE — PROGRESS NOTES
[facility]    Medicine Progress Note - Hospitalist Service    Date of Admission:  7/17/2025    Assessment & Plan   Denia Raymond is a 40 year old female with depression and alcohol use history recent uncomplicated diverticulitis treated with antibiotic, presented to the ER due to abdominal pain, found to have acute sigmoid diverticulitis with possible abscess and admitted on 7/17/2025.       Acute sigmoid diverticulitis with possible abscess  Patient in ER 7/6/2025 with 4 days of LLQ abdominal pain.    *7/6 CT AP: mild acute uncomplicated sigmoid diverticulitis.   Treated with 7 days of ciprofloxacin and Flagyl.  Symptom improved initially, and now worse.  CBC CMP lipase normal, beta-hCG negative.    *7/17 CT AP now shows persistent mild diverticulitis of the proximal sigmoid colon with questionable developing intramural abscess.  No free air or drainable fluid.  - Cefepime and Flagyl given in ER given allergy.  Continue meropenem q6h  - Full liquid diet, ensure clear ordered with meals for her to try  - IVF @75ml/hr  - pepcid BID  - Colorectal surgery following, no need for OR at this time  - PRN pain control with IV dilaudid, oral oxycodone  - nutrition consulted  - repeat CT A/P in 2-3w  - Colonoscopy in 6-8w    Anemia  Hgb 11.1 on 7/18, likely dilutional. Recently on her period.  - CBC in AM     H/o Depression   - used to be on sertraline, no longer more than a year.     H/o alcohol use  - Sober for 4+ years          Diet: Full Liquid Diet  Snacks/Supplements Adult: Ensure Clear; With Meals    DVT Prophylaxis: Pneumatic Compression Devices  Maloney Catheter: Not present  Lines: None     Cardiac Monitoring: None  Code Status: Full Code      Clinically Significant Risk Factors Present on Admission          # Hyperchloremia: Highest Cl = 109 mmol/L in last 2 days, will monitor as appropriate      # Hypocalcemia: Lowest Ca = 8 mg/dL in last 2 days, will monitor and replace as appropriate                               Social Drivers of Health    Tobacco Use: Medium Risk (4/11/2023)    Patient History     Smoking Tobacco Use: Former     Smokeless Tobacco Use: Never   Interpersonal Safety: High Risk (7/17/2025)    Interpersonal Safety     Do you feel physically and emotionally safe where you currently live?: No     Within the past 12 months, have you been hit, slapped, kicked or otherwise physically hurt by someone?: No     Within the past 12 months, have you been humiliated or emotionally abused in other ways by your partner or ex-partner?: No          Disposition Plan     Medically Ready for Discharge: Anticipated in 2-4 Days  Pending diet tolerance, pain control           Mandy Bonilla, DO  Hospitalist Service  [facility]  Securely message with [employer] (more info)  Text page via [employer] Paging/Directory   ______________________________________________________________________    Interval History   Patient seen and examined. Pain better controlled with IV dilaudid. Got a little sleep overnight. Felt the oxycodone didn't touch the pain overnight (5mg). Most of her pain is in her left lower quadrant, but feels it in RLQ sometimes as well. Had watery stool this morning. Overall with the dilaudid, she is feeling somewhat better. Had some full liquids for breakfast.    Physical Exam   Vital Signs: Temp: 98.4  F (36.9  C) Temp src: Oral BP: 115/64 Pulse: 69   Resp: 18 SpO2: 98 % O2 Device: None (Room air)    Weight: 110 lbs 0 oz    Constitutional: Awake, alert, cooperative, no apparent distress  Respiratory: Clear to auscultation bilaterally, no crackles or wheezing  Cardiovascular: Regular rate and rhythm, normal S1 and S2, and no murmur noted  GI: lower quadrants tender to light palpation L>R, soft, not distended  Skin/Integumen: No rashes, no cyanosis, no edema  Other:      Medical Decision Making       35 MINUTES SPENT BY ME on the date of service doing chart review, history, exam,  documentation & further activities per the note.      Data     I have personally reviewed the following data over the past 24 hrs:    7.2  \   11.1 (L)   / 243     138 109 (H) 4.3 (L) /  84   4.3 23 0.59 \     ALT: 11 AST: 12 AP: 37 (L) TBILI: 0.4   ALB: 4.2 TOT PROTEIN: 6.7 LIPASE: 34     Procal: N/A CRP: N/A Lactic Acid: 0.7         Imaging results reviewed over the past 24 hrs:   Recent Results (from the past 24 hours)   CT Abdomen Pelvis w Contrast    Narrative    EXAM: CT ABDOMEN PELVIS W CONTRAST  LOCATION: [facility]  DATE: 7/17/2025    INDICATION: recurrent lower abd pain, recent dx diverticulitis, no urinary symptoms  COMPARISON: CT 7/6/2025  TECHNIQUE: CT scan of the abdomen and pelvis was performed following injection of IV contrast. Multiplanar reformats were obtained. Dose reduction techniques were used.  CONTRAST: 60 mL Isovue 370    FINDINGS:   LOWER CHEST: Normal.    HEPATOBILIARY: 9 mm hypodense liver dome lesion may represent a cyst or hemangioma. Normal gallbladder and bile ducts.    PANCREAS: Normal.    SPLEEN: Normal.    ADRENAL GLANDS: Normal.    KIDNEYS/BLADDER: Normal.    BOWEL: Distal colonic diverticulosis. Persistent mild inflammatory changes of the proximal sigmoid colon. Questionable small developing intramural abscess measuring 1.0 x 0.9 x 0.7 cm, image numbers 133 series 4 and 26 series 5. No free air.    LYMPH NODES: Normal.    VASCULATURE: Normal.    PELVIC ORGANS: IUD. Intravaginal tampon.    MUSCULOSKELETAL: Normal.      Impression    IMPRESSION:   1.  Persistent mild diverticulitis of the proximal sigmoid colon. Questionable small developing intramural abscess within the inflamed colon. No drainable fluid collection and no free air.

## 2025-07-19 ENCOUNTER — HEALTH MAINTENANCE LETTER (OUTPATIENT)
Age: 40
End: 2025-07-19

## 2025-07-19 LAB
ANION GAP SERPL CALCULATED.3IONS-SCNC: 10 MMOL/L (ref 7–15)
BUN SERPL-MCNC: 3.6 MG/DL (ref 6–20)
CALCIUM SERPL-MCNC: 8.4 MG/DL (ref 8.8–10.4)
CHLORIDE SERPL-SCNC: 101 MMOL/L (ref 98–107)
CREAT SERPL-MCNC: 0.56 MG/DL (ref 0.51–0.95)
EGFRCR SERPLBLD CKD-EPI 2021: >90 ML/MIN/1.73M2
ERYTHROCYTE [DISTWIDTH] IN BLOOD BY AUTOMATED COUNT: 12.6 % (ref 10–15)
GLUCOSE SERPL-MCNC: 106 MG/DL (ref 70–99)
HCO3 SERPL-SCNC: 27 MMOL/L (ref 22–29)
HCT VFR BLD AUTO: 34.3 % (ref 35–47)
HGB BLD-MCNC: 11.5 G/DL (ref 11.7–15.7)
MAGNESIUM SERPL-MCNC: 1.7 MG/DL (ref 1.7–2.3)
MCH RBC QN AUTO: 31 PG (ref 26.5–33)
MCHC RBC AUTO-ENTMCNC: 33.5 G/DL (ref 31.5–36.5)
MCV RBC AUTO: 93 FL (ref 78–100)
PHOSPHATE SERPL-MCNC: 3.3 MG/DL (ref 2.5–4.5)
PLATELET # BLD AUTO: 223 10E3/UL (ref 150–450)
POTASSIUM SERPL-SCNC: 3.8 MMOL/L (ref 3.4–5.3)
RBC # BLD AUTO: 3.71 10E6/UL (ref 3.8–5.2)
SODIUM SERPL-SCNC: 138 MMOL/L (ref 135–145)
WBC # BLD AUTO: 8.4 10E3/UL (ref 4–11)

## 2025-07-19 PROCEDURE — 250N000011 HC RX IP 250 OP 636: Performed by: HOSPITALIST

## 2025-07-19 PROCEDURE — 99231 SBSQ HOSP IP/OBS SF/LOW 25: CPT | Performed by: COLON & RECTAL SURGERY

## 2025-07-19 PROCEDURE — 250N000013 HC RX MED GY IP 250 OP 250 PS 637: Performed by: HOSPITALIST

## 2025-07-19 PROCEDURE — 83735 ASSAY OF MAGNESIUM: CPT | Performed by: HOSPITALIST

## 2025-07-19 PROCEDURE — 85027 COMPLETE CBC AUTOMATED: CPT | Performed by: HOSPITALIST

## 2025-07-19 PROCEDURE — 84100 ASSAY OF PHOSPHORUS: CPT | Performed by: HOSPITALIST

## 2025-07-19 PROCEDURE — 99232 SBSQ HOSP IP/OBS MODERATE 35: CPT | Performed by: HOSPITALIST

## 2025-07-19 PROCEDURE — 80048 BASIC METABOLIC PNL TOTAL CA: CPT | Performed by: HOSPITALIST

## 2025-07-19 PROCEDURE — 258N000003 HC RX IP 258 OP 636: Performed by: HOSPITALIST

## 2025-07-19 PROCEDURE — 120N000001 HC R&B MED SURG/OB

## 2025-07-19 PROCEDURE — 36415 COLL VENOUS BLD VENIPUNCTURE: CPT | Performed by: HOSPITALIST

## 2025-07-19 RX ORDER — MAGNESIUM SULFATE HEPTAHYDRATE 40 MG/ML
2 INJECTION, SOLUTION INTRAVENOUS ONCE
Status: COMPLETED | OUTPATIENT
Start: 2025-07-19 | End: 2025-07-19

## 2025-07-19 RX ORDER — SODIUM CHLORIDE 9 MG/ML
INJECTION, SOLUTION INTRAVENOUS CONTINUOUS
Status: DISCONTINUED | OUTPATIENT
Start: 2025-07-19 | End: 2025-07-20

## 2025-07-19 RX ADMIN — ACETAMINOPHEN 650 MG: 325 TABLET ORAL at 17:54

## 2025-07-19 RX ADMIN — HYDROMORPHONE HYDROCHLORIDE 0.4 MG: 0.2 INJECTION, SOLUTION INTRAMUSCULAR; INTRAVENOUS; SUBCUTANEOUS at 21:47

## 2025-07-19 RX ADMIN — MEROPENEM 500 MG: 500 INJECTION, POWDER, FOR SOLUTION INTRAVENOUS at 00:27

## 2025-07-19 RX ADMIN — FAMOTIDINE 20 MG: 10 INJECTION, SOLUTION INTRAVENOUS at 17:53

## 2025-07-19 RX ADMIN — MAGNESIUM SULFATE HEPTAHYDRATE 2 G: 40 INJECTION, SOLUTION INTRAVENOUS at 15:22

## 2025-07-19 RX ADMIN — ACETAMINOPHEN 650 MG: 325 TABLET ORAL at 08:35

## 2025-07-19 RX ADMIN — MEROPENEM 500 MG: 500 INJECTION, POWDER, FOR SOLUTION INTRAVENOUS at 06:43

## 2025-07-19 RX ADMIN — ONDANSETRON 4 MG: 2 INJECTION, SOLUTION INTRAMUSCULAR; INTRAVENOUS at 17:53

## 2025-07-19 RX ADMIN — OXYCODONE HYDROCHLORIDE 10 MG: 5 TABLET ORAL at 08:35

## 2025-07-19 RX ADMIN — HYDROMORPHONE HYDROCHLORIDE 0.4 MG: 0.2 INJECTION, SOLUTION INTRAMUSCULAR; INTRAVENOUS; SUBCUTANEOUS at 06:42

## 2025-07-19 RX ADMIN — ONDANSETRON 4 MG: 2 INJECTION, SOLUTION INTRAMUSCULAR; INTRAVENOUS at 01:45

## 2025-07-19 RX ADMIN — HYDROMORPHONE HYDROCHLORIDE 0.4 MG: 0.2 INJECTION, SOLUTION INTRAMUSCULAR; INTRAVENOUS; SUBCUTANEOUS at 10:21

## 2025-07-19 RX ADMIN — ONDANSETRON 4 MG: 2 INJECTION, SOLUTION INTRAMUSCULAR; INTRAVENOUS at 11:26

## 2025-07-19 RX ADMIN — Medication 5 MG: at 00:28

## 2025-07-19 RX ADMIN — FAMOTIDINE 20 MG: 10 INJECTION, SOLUTION INTRAVENOUS at 06:43

## 2025-07-19 RX ADMIN — MEROPENEM 500 MG: 500 INJECTION, POWDER, FOR SOLUTION INTRAVENOUS at 12:59

## 2025-07-19 RX ADMIN — OXYCODONE HYDROCHLORIDE 10 MG: 5 TABLET ORAL at 17:54

## 2025-07-19 RX ADMIN — MEROPENEM 500 MG: 500 INJECTION, POWDER, FOR SOLUTION INTRAVENOUS at 17:54

## 2025-07-19 RX ADMIN — SODIUM CHLORIDE: 9 INJECTION, SOLUTION INTRAVENOUS at 10:20

## 2025-07-19 RX ADMIN — HYDROMORPHONE HYDROCHLORIDE 0.4 MG: 0.2 INJECTION, SOLUTION INTRAMUSCULAR; INTRAVENOUS; SUBCUTANEOUS at 15:22

## 2025-07-19 RX ADMIN — HYDROMORPHONE HYDROCHLORIDE 0.4 MG: 0.2 INJECTION, SOLUTION INTRAMUSCULAR; INTRAVENOUS; SUBCUTANEOUS at 01:45

## 2025-07-19 RX ADMIN — SODIUM CHLORIDE: 9 INJECTION, SOLUTION INTRAVENOUS at 11:25

## 2025-07-19 RX ADMIN — Medication 5 MG: at 21:47

## 2025-07-19 ASSESSMENT — ACTIVITIES OF DAILY LIVING (ADL)
ADLS_ACUITY_SCORE: 35
ADLS_ACUITY_SCORE: 24
ADLS_ACUITY_SCORE: 35
ADLS_ACUITY_SCORE: 35
ADLS_ACUITY_SCORE: 24
ADLS_ACUITY_SCORE: 35
ADLS_ACUITY_SCORE: 24
ADLS_ACUITY_SCORE: 35
ADLS_ACUITY_SCORE: 24
ADLS_ACUITY_SCORE: 35
ADLS_ACUITY_SCORE: 24
ADLS_ACUITY_SCORE: 35
ADLS_ACUITY_SCORE: 24
ADLS_ACUITY_SCORE: 35
ADLS_ACUITY_SCORE: 24
ADLS_ACUITY_SCORE: 35

## 2025-07-19 NOTE — PROGRESS NOTES
COLON & RECTAL SURGERY  PROGRESS NOTE    July 19, 2025    SUBJECTIVE: Reports persistent abdominal pain.  Tolerating a full liquid diet.  Having bowel function.    OBJECTIVE:  Temp:  [98.3  F (36.8  C)-98.5  F (36.9  C)] 98.3  F (36.8  C)  Pulse:  [60-83] 60  Resp:  [16-18] 18  BP: ()/(64-66) 99/66  SpO2:  [98 %-100 %] 99 %  No intake or output data in the 24 hours ending 07/19/25 0857    GENERAL:  Awake, alert, no acute distress  EXTREMITIES: Warm and well perfused, no edema   ABDOMEN:  Soft, focal tenderness in the left lower quadrant without rigidity or guarding    LABS:  Lab Results   Component Value Date    WBC 7.2 07/18/2025     Lab Results   Component Value Date    HGB 11.1 07/18/2025     Lab Results   Component Value Date    HCT 32.8 07/18/2025     Lab Results   Component Value Date     07/18/2025     Last Basic Metabolic Panel:  Lab Results   Component Value Date     07/18/2025      Lab Results   Component Value Date    POTASSIUM 4.3 07/18/2025     Lab Results   Component Value Date    CHLORIDE 109 07/18/2025     Lab Results   Component Value Date    WANDA 8.0 07/18/2025     Lab Results   Component Value Date    CO2 23 07/18/2025     Lab Results   Component Value Date    BUN 4.3 07/18/2025     Lab Results   Component Value Date    CR 0.59 07/18/2025     Lab Results   Component Value Date    GLC 84 07/18/2025       ASSESSMENT/PLAN: Denia Raymond is a 40 year old female admitted with acute diverticulitis with a likely intramural abscess.    Low fiber diet as tolerated.  Continue IV antibiotics  Hopeful to avoid surgical intervention during this admission.  If she has persistent pain, or increasing pain or change in vital signs, we will plan to repeat a CT scan of the abdomen and pelvis to assess for a drainable fluid collection.  If her abdominal pain is able to be controlled during this admission, we will likely need to consider elective surgical intervention in 6 to 8 weeks after  endoscopic evaluation.  CRS will follow.  Please call with questions, concerns or changes in clinical status.    Clinically Significant Risk Factors          # Hyperchloremia: Highest Cl = 109 mmol/L in last 2 days, will monitor as appropriate      # Hypocalcemia: Lowest Ca = 8 mg/dL in last 2 days, will monitor and replace as appropriate                             26 total minutes were spent on today's visit    For questions/paging, please contact the CRS office at [phone].    Tricia Castellon MD  Colorectal Surgery    [facility]  [address] [address]  T: [phone]  F: [phone]

## 2025-07-19 NOTE — PLAN OF CARE
Goal Outcome Evaluation:       0700-1930  Orientation: A&O x4   Aggression Stop Light: green   Activity: ind   Diet/BS Checks: advanced to low fiber diet- tolerating well. Good appetite. Patient did have one episode of emesis following breakfast, but reports improvement with nausea after receiving zofran.   Tele:  n/a   IV Access/Drains: R PIV infusing NS at 50 mL/hr.   Pain Management: PRN dilaudid given x2 for 8/10 abdominal pain. PRN tylenol and oxycodone given x2 for headache.   Abnormal VS/Results: VSS on RA. On K+ protocols- AM recheck.   Bowel/Bladder: continent  Skin/Wounds: intact   Consults: CRS  D/C Disposition: pending   Other Info:

## 2025-07-19 NOTE — PLAN OF CARE
Shift summary: 1813-0856    Orientation: A&Ox4  Aggression Stop Light: Green  Activity: Independent  Diet/BS Checks: Full liquid  Tele: N/A  IV Access/Drains: R PIV, SL  Pain Management: IV Dilaudid x3 and Oxycodone x1 given for abd pain, Tylenol x1 for headache  Abnormal VS/Results: VSS, on RA  Bowel/Bladder: Continent of B&B  Skin/Wounds: Intact  Consults: CRS  D/C Disposition: Pending improvement  Other Info:   -Zofran x1 for nausea  -On K+ replacement, rechecks this AM

## 2025-07-19 NOTE — PROGRESS NOTES
[facility]    Medicine Progress Note - Hospitalist Service    Date of Admission:  7/17/2025    Assessment & Plan   Denia Raymond is a 40 year old female with depression and alcohol use history recent uncomplicated diverticulitis treated with antibiotic, presented to the ER due to abdominal pain, found to have acute sigmoid diverticulitis with possible abscess and admitted on 7/17/2025.       Acute sigmoid diverticulitis with possible abscess  Patient in ER 7/6/2025 with 4 days of LLQ abdominal pain.    *7/6 CT AP: mild acute uncomplicated sigmoid diverticulitis.   Treated with 7 days of ciprofloxacin and Flagyl.  Symptom improved initially, and now worse.  CBC CMP lipase normal, beta-hCG negative.    *7/17 CT AP now shows persistent mild diverticulitis of the proximal sigmoid colon with questionable developing intramural abscess.  No free air or drainable fluid.  - Cefepime and Flagyl given in ER given allergy.  Continue meropenem q6h then will transition to cipro/flagyl for 14 more days on discharge  - IVF @50 ml/hr  - pepcid BID  - Colorectal surgery following, no need for OR at this time  - PRN pain control with IV dilaudid, oral oxycodone  - diet advanced to low fiber on 7/19  - repeat CT A/P in 2-3w, sooner if increasing pain  - if persisting abscess on CT scan in 2-3w, will need to consider sigmoid colectomy in 6-8w with colonoscopy prior.    Anemia  Hgb 11.1 on 7/18, likely dilutional. Recently on her period.  - hgb improved to 11.5 7/19     H/o Depression   Not currently taking medications     H/o alcohol use  - Sober for 4+ years          Diet: Snacks/Supplements Adult: Ensure Clear; With Meals  Low Fiber Diet    DVT Prophylaxis: Pneumatic Compression Devices  Maloney Catheter: Not present  Lines: None     Cardiac Monitoring: None  Code Status: Full Code      Clinically Significant Risk Factors          # Hyperchloremia: Highest Cl = 109 mmol/L in last 2 days, will monitor as  appropriate      # Hypocalcemia: Lowest Ca = 8 mg/dL in last 2 days, will monitor and replace as appropriate                               Social Drivers of Health    Tobacco Use: Medium Risk (4/11/2023)    Patient History     Smoking Tobacco Use: Former     Smokeless Tobacco Use: Never   Interpersonal Safety: High Risk (7/17/2025)    Interpersonal Safety     Do you feel physically and emotionally safe where you currently live?: No     Within the past 12 months, have you been hit, slapped, kicked or otherwise physically hurt by someone?: No     Within the past 12 months, have you been humiliated or emotionally abused in other ways by your partner or ex-partner?: No          Disposition Plan     Medically Ready for Discharge: Anticipated Tomorrow  Pending pain control           Mandy Bonilla DO  Hospitalist Service  [facility]  Securely message with [employer] (more info)  Text page via [facility] Paging/Directory   ______________________________________________________________________    Interval History   Patient seen and examined. Still requiring IV dilaudid overnight and in the AM. Diet upgraded to low fiber. Tolerated full liquids, but the sweetness of the foods was tough. Plan to continue IV abx today. Hopefully can wean from IV dilaudid and be stable for discharge on 7/20    Physical Exam   Vital Signs: Temp: 98.3  F (36.8  C) Temp src: Oral BP: 99/66 Pulse: 60   Resp: 18 SpO2: 99 % O2 Device: None (Room air)    Weight: 121 lbs 11.1 oz    Constitutional: Awake, alert, cooperative, no apparent distress  Respiratory: Clear to auscultation bilaterally, no crackles or wheezing  Cardiovascular: Regular rate and rhythm, normal S1 and S2, and no murmur noted  GI: little tender lower quadrants, soft, not distended  Skin/Integumen: No rashes, no cyanosis, no edema  Other:      Medical Decision Making       35 MINUTES SPENT BY ME on the date of service doing chart review, history, exam, documentation  & further activities per the note.      Data     I have personally reviewed the following data over the past 24 hrs:    8.4  \   11.5 (L)   / 223     138 101 3.6 (L) /  106 (H)   3.8 27 0.56 \       Imaging results reviewed over the past 24 hrs:   No results found for this or any previous visit (from the past 24 hours).

## 2025-07-20 LAB
ANION GAP SERPL CALCULATED.3IONS-SCNC: 6 MMOL/L (ref 7–15)
BUN SERPL-MCNC: 6 MG/DL (ref 6–20)
CALCIUM SERPL-MCNC: 8.4 MG/DL (ref 8.8–10.4)
CHLORIDE SERPL-SCNC: 105 MMOL/L (ref 98–107)
CREAT SERPL-MCNC: 0.58 MG/DL (ref 0.51–0.95)
EGFRCR SERPLBLD CKD-EPI 2021: >90 ML/MIN/1.73M2
GLUCOSE SERPL-MCNC: 94 MG/DL (ref 70–99)
HCO3 SERPL-SCNC: 27 MMOL/L (ref 22–29)
MAGNESIUM SERPL-MCNC: 2 MG/DL (ref 1.7–2.3)
PHOSPHATE SERPL-MCNC: 3.3 MG/DL (ref 2.5–4.5)
POTASSIUM SERPL-SCNC: 4.1 MMOL/L (ref 3.4–5.3)
SODIUM SERPL-SCNC: 138 MMOL/L (ref 135–145)

## 2025-07-20 PROCEDURE — 82310 ASSAY OF CALCIUM: CPT | Performed by: HOSPITALIST

## 2025-07-20 PROCEDURE — 99232 SBSQ HOSP IP/OBS MODERATE 35: CPT | Performed by: HOSPITALIST

## 2025-07-20 PROCEDURE — 250N000013 HC RX MED GY IP 250 OP 250 PS 637: Performed by: HOSPITALIST

## 2025-07-20 PROCEDURE — 83735 ASSAY OF MAGNESIUM: CPT | Performed by: HOSPITALIST

## 2025-07-20 PROCEDURE — 99231 SBSQ HOSP IP/OBS SF/LOW 25: CPT | Performed by: COLON & RECTAL SURGERY

## 2025-07-20 PROCEDURE — 250N000011 HC RX IP 250 OP 636: Performed by: HOSPITALIST

## 2025-07-20 PROCEDURE — 120N000001 HC R&B MED SURG/OB

## 2025-07-20 PROCEDURE — 84100 ASSAY OF PHOSPHORUS: CPT | Performed by: HOSPITALIST

## 2025-07-20 PROCEDURE — 36415 COLL VENOUS BLD VENIPUNCTURE: CPT | Performed by: HOSPITALIST

## 2025-07-20 RX ADMIN — HYDROMORPHONE HYDROCHLORIDE 0.4 MG: 0.2 INJECTION, SOLUTION INTRAMUSCULAR; INTRAVENOUS; SUBCUTANEOUS at 15:02

## 2025-07-20 RX ADMIN — Medication 5 MG: at 21:49

## 2025-07-20 RX ADMIN — ONDANSETRON 4 MG: 4 TABLET, ORALLY DISINTEGRATING ORAL at 08:47

## 2025-07-20 RX ADMIN — HYDROMORPHONE HYDROCHLORIDE 0.4 MG: 0.2 INJECTION, SOLUTION INTRAMUSCULAR; INTRAVENOUS; SUBCUTANEOUS at 08:47

## 2025-07-20 RX ADMIN — ACETAMINOPHEN 650 MG: 325 TABLET ORAL at 11:47

## 2025-07-20 RX ADMIN — OXYCODONE HYDROCHLORIDE 10 MG: 5 TABLET ORAL at 05:37

## 2025-07-20 RX ADMIN — ACETAMINOPHEN 650 MG: 325 TABLET ORAL at 21:49

## 2025-07-20 RX ADMIN — FAMOTIDINE 20 MG: 10 INJECTION, SOLUTION INTRAVENOUS at 05:38

## 2025-07-20 RX ADMIN — MEROPENEM 500 MG: 500 INJECTION, POWDER, FOR SOLUTION INTRAVENOUS at 11:47

## 2025-07-20 RX ADMIN — OXYCODONE HYDROCHLORIDE 10 MG: 5 TABLET ORAL at 18:38

## 2025-07-20 RX ADMIN — OXYCODONE HYDROCHLORIDE 10 MG: 5 TABLET ORAL at 11:47

## 2025-07-20 RX ADMIN — ACETAMINOPHEN 650 MG: 325 TABLET ORAL at 15:57

## 2025-07-20 RX ADMIN — OXYCODONE HYDROCHLORIDE 5 MG: 5 TABLET ORAL at 21:49

## 2025-07-20 RX ADMIN — MEROPENEM 500 MG: 500 INJECTION, POWDER, FOR SOLUTION INTRAVENOUS at 01:01

## 2025-07-20 RX ADMIN — MEROPENEM 500 MG: 500 INJECTION, POWDER, FOR SOLUTION INTRAVENOUS at 05:40

## 2025-07-20 RX ADMIN — MEROPENEM 500 MG: 500 INJECTION, POWDER, FOR SOLUTION INTRAVENOUS at 18:34

## 2025-07-20 NOTE — PROGRESS NOTES
COLON & RECTAL SURGERY  PROGRESS NOTE    SUBJECTIVE: Reports improved abdominal pain.  Tolerating a solid diet.  Having bowel function.    OBJECTIVE:  Temp:  [97.2  F (36.2  C)-98  F (36.7  C)] 97.2  F (36.2  C)  Pulse:  [62-65] 62  Resp:  [16-20] 20  BP: (100-133)/(58-81) 133/81  SpO2:  [98 %-100 %] 100 %  No intake or output data in the 24 hours ending 07/19/25 0857    GENERAL:  Awake, alert, no acute distress  EXTREMITIES: Warm and well perfused, no edema   ABDOMEN:  Soft, focal tenderness in the left lower quadrant without rigidity or guarding    LABS:  Lab Results   Component Value Date    WBC 7.2 07/18/2025     Lab Results   Component Value Date    HGB 11.1 07/18/2025     Lab Results   Component Value Date    HCT 32.8 07/18/2025     Lab Results   Component Value Date     07/18/2025     Last Basic Metabolic Panel:  Lab Results   Component Value Date     07/18/2025      Lab Results   Component Value Date    POTASSIUM 4.3 07/18/2025     Lab Results   Component Value Date    CHLORIDE 109 07/18/2025     Lab Results   Component Value Date    WANDA 8.0 07/18/2025     Lab Results   Component Value Date    CO2 23 07/18/2025     Lab Results   Component Value Date    BUN 4.3 07/18/2025     Lab Results   Component Value Date    CR 0.59 07/18/2025     Lab Results   Component Value Date    GLC 84 07/18/2025       ASSESSMENT/PLAN: Denia Raymond is a 40 year old female admitted with acute diverticulitis with a likely intramural abscess.    Low fiber diet as tolerated.  Continue IV antibiotics. Can convert to PO abx if abdominal pain improves today.  Hopeful to avoid surgical intervention during this admission. Plan to repeat a CT scan of the abdomen and pelvis tomorrow to assess for a drainable fluid collection if pain persists.  If her abdominal pain is able to be controlled during this admission, we will likely need to consider elective surgical intervention in 6 to 8 weeks after endoscopic evaluation.  CRS  will follow.  Please call with questions, concerns or changes in clinical status.    Clinically Significant Risk Factors                                       27 total minutes were spent on today's visit    For questions/paging, please contact the CRS office at [phone].    Tricia Castellon MD  Colorectal Surgery    [facility]  [address] [address]  T: [phone]  F: [phone]

## 2025-07-20 NOTE — PROGRESS NOTES
[facility]    Medicine Progress Note - Hospitalist Service    Date of Admission:  7/17/2025    Assessment & Plan   Denia Raymond is a 40 year old female with depression and alcohol use history recent uncomplicated diverticulitis treated with antibiotic, presented to the ER due to abdominal pain, found to have acute sigmoid diverticulitis with likely abscess and admitted on 7/17/2025.       Acute sigmoid diverticulitis with likely intramural abscess  Patient in ER 7/6/2025 with 4 days of LLQ abdominal pain.    *7/6 CT AP: mild acute uncomplicated sigmoid diverticulitis.   Treated with 7 days of ciprofloxacin and Flagyl.  Symptom improved initially, and now worse.  CBC CMP lipase normal, beta-hCG negative.    *7/17 CT AP now shows persistent mild diverticulitis of the proximal sigmoid colon with questionable developing intramural abscess.  No free air or drainable fluid.  - Cefepime and Flagyl given in ER given allergy.  Continue meropenem q6h then will transition to cipro/flagyl for 14 more days on discharge  - monitor off IVF on 7/20  - Colorectal surgery following, no need for OR at this time  - PRN pain control with IV dilaudid, oral oxycodone  - low fiber diet  - repeat CT A/P in 2-3w, if still requiring IV pain meds on 7/21 will repeat CT AP  - if persisting abscess on CT scan in 2-3w, will need to consider sigmoid colectomy in 6-8w with colonoscopy prior.    Anemia  Hgb 11.1 on 7/18, likely dilutional, was 13.1 on admit. Recently on her period.  - hgb improved to 11.5 7/19, recheck CBC on 7/21     H/o Depression   Not currently taking medications     H/o alcohol use  - Sober for 4+ years          Diet: Snacks/Supplements Adult: Ensure Clear; With Meals  Low Fiber Diet    DVT Prophylaxis: Pneumatic Compression Devices  Maloney Catheter: Not present  Lines: None     Cardiac Monitoring: None  Code Status: Full Code      Clinically Significant Risk Factors                                          Social Drivers of Health    Tobacco Use: Medium Risk (4/11/2023)    Patient History     Smoking Tobacco Use: Former     Smokeless Tobacco Use: Never   Interpersonal Safety: High Risk (7/17/2025)    Interpersonal Safety     Do you feel physically and emotionally safe where you currently live?: No     Within the past 12 months, have you been hit, slapped, kicked or otherwise physically hurt by someone?: No     Within the past 12 months, have you been humiliated or emotionally abused in other ways by your partner or ex-partner?: No          Disposition Plan     Medically Ready for Discharge: Anticipated Tomorrow  Pending pain control           Mandy Bonilla DO  Hospitalist Service  [facility]  Securely message with [employer] (more info)  Text page via [employer] Paging/Directory   ______________________________________________________________________    Interval History   Patient seen and examined. Didn't require IV pain meds after 9pm last night until this AM when she got up to open the blinds and unfortunately had severe pain that required IV dilaudid. Had vomiting with breakfast on 7/19, but tolerated lunch and dinner well. Continue to monitor on IV abx today, but will stop continuous IVF given her improved oral intake. Encouraged her to do more movement throughout the day and we want to make sure her pain remains controlled for discharge.    Physical Exam   Vital Signs: Temp: 97.2  F (36.2  C) Temp src: Oral BP: 133/81 Pulse: 62   Resp: 20 SpO2: 100 % O2 Device: None (Room air)    Weight: 121 lbs 11.1 oz    Constitutional: Awake, alert, cooperative, no apparent distress  Respiratory: Clear to auscultation bilaterally, no crackles or wheezing  Cardiovascular: Regular rate and rhythm, normal S1 and S2, and no murmur noted  GI: little tender lower quadrants, has heat pack in place, soft, not distended  Skin/Integumen: No rashes, no cyanosis, no edema  Other:      Medical Decision Making        40 MINUTES SPENT BY ME on the date of service doing chart review, history, exam, documentation & further activities per the note.      Data     I have personally reviewed the following data over the past 24 hrs:    N/A  \   N/A   / N/A     138 105 6.0 /  94   4.1 27 0.58 \       Imaging results reviewed over the past 24 hrs:   No results found for this or any previous visit (from the past 24 hours).

## 2025-07-20 NOTE — PLAN OF CARE
Shift summary: 1900-0730    Orientation: A&Ox4  Aggression Stop Light: Green  Activity: Independent  Diet/BS Checks: Low fiber  Tele: N/A  IV Access/Drains: R PIV infusing NS at 50mls/hr w int abx  Pain Management: IV Dilaudid x1 and oxycodone x1 given  Abnormal VS/Results: VSS, on RA  Bowel/Bladder: Continent of B&B  Skin/Wounds: Intact  Consults: CRS  D/C Disposition: Pending improvement  Other Info:   -On K+ replacement, rechecks this AM

## 2025-07-20 NOTE — PLAN OF CARE
Goal Outcome Evaluation:       0700-1930  Orientation: A&O x4   Aggression Stop Light: green   Activity: ind   Diet/BS Checks: low fiber diet- tolerating well. Good appetite. Zofran given x1.   Tele:  n/a   IV Access/Drains: R PIV SL with intermittent antibiotics.   Pain Management: PRN dilaudid given x2 for 8/10 abdominal pain. PRN tylenol and oxycodone given x2 for headache.   Abnormal VS/Results: VSS on RA. On K+ protocols- AM recheck.   Bowel/Bladder: continent  Skin/Wounds: intact   Consults: CRS  D/C Disposition: pending   Other Info:

## 2025-07-21 VITALS
DIASTOLIC BLOOD PRESSURE: 71 MMHG | BODY MASS INDEX: 21.56 KG/M2 | SYSTOLIC BLOOD PRESSURE: 111 MMHG | WEIGHT: 121.69 LBS | TEMPERATURE: 98.6 F | OXYGEN SATURATION: 99 % | HEART RATE: 65 BPM | RESPIRATION RATE: 16 BRPM | HEIGHT: 63 IN

## 2025-07-21 LAB
ANION GAP SERPL CALCULATED.3IONS-SCNC: 7 MMOL/L (ref 7–15)
BUN SERPL-MCNC: 6.1 MG/DL (ref 6–20)
CALCIUM SERPL-MCNC: 8.7 MG/DL (ref 8.8–10.4)
CHLORIDE SERPL-SCNC: 103 MMOL/L (ref 98–107)
CREAT SERPL-MCNC: 0.46 MG/DL (ref 0.51–0.95)
EGFRCR SERPLBLD CKD-EPI 2021: >90 ML/MIN/1.73M2
ERYTHROCYTE [DISTWIDTH] IN BLOOD BY AUTOMATED COUNT: 12.2 % (ref 10–15)
GLUCOSE SERPL-MCNC: 102 MG/DL (ref 70–99)
HCO3 SERPL-SCNC: 27 MMOL/L (ref 22–29)
HCT VFR BLD AUTO: 33.1 % (ref 35–47)
HGB BLD-MCNC: 11.4 G/DL (ref 11.7–15.7)
MCH RBC QN AUTO: 31 PG (ref 26.5–33)
MCHC RBC AUTO-ENTMCNC: 34.4 G/DL (ref 31.5–36.5)
MCV RBC AUTO: 90 FL (ref 78–100)
PLATELET # BLD AUTO: 245 10E3/UL (ref 150–450)
POTASSIUM SERPL-SCNC: 4.1 MMOL/L (ref 3.4–5.3)
RBC # BLD AUTO: 3.68 10E6/UL (ref 3.8–5.2)
SODIUM SERPL-SCNC: 137 MMOL/L (ref 135–145)
WBC # BLD AUTO: 11.3 10E3/UL (ref 4–11)

## 2025-07-21 PROCEDURE — 82374 ASSAY BLOOD CARBON DIOXIDE: CPT | Performed by: HOSPITALIST

## 2025-07-21 PROCEDURE — 250N000013 HC RX MED GY IP 250 OP 250 PS 637: Performed by: HOSPITALIST

## 2025-07-21 PROCEDURE — 85027 COMPLETE CBC AUTOMATED: CPT | Performed by: HOSPITALIST

## 2025-07-21 PROCEDURE — 99231 SBSQ HOSP IP/OBS SF/LOW 25: CPT

## 2025-07-21 PROCEDURE — 99239 HOSP IP/OBS DSCHRG MGMT >30: CPT | Performed by: HOSPITALIST

## 2025-07-21 PROCEDURE — 36415 COLL VENOUS BLD VENIPUNCTURE: CPT | Performed by: HOSPITALIST

## 2025-07-21 PROCEDURE — 250N000011 HC RX IP 250 OP 636: Performed by: HOSPITALIST

## 2025-07-21 RX ORDER — METHOCARBAMOL 500 MG/1
500 TABLET, FILM COATED ORAL 3 TIMES DAILY
Status: DISCONTINUED | OUTPATIENT
Start: 2025-07-21 | End: 2025-07-21 | Stop reason: HOSPADM

## 2025-07-21 RX ORDER — OXYCODONE HYDROCHLORIDE 10 MG/1
10 TABLET ORAL EVERY 4 HOURS PRN
Qty: 15 TABLET | Refills: 0 | Status: SHIPPED | OUTPATIENT
Start: 2025-07-21 | End: 2025-07-29

## 2025-07-21 RX ORDER — METRONIDAZOLE 500 MG/1
500 TABLET ORAL 3 TIMES DAILY
Qty: 42 TABLET | Refills: 0 | Status: SHIPPED | OUTPATIENT
Start: 2025-07-21 | End: 2025-08-04

## 2025-07-21 RX ORDER — METHOCARBAMOL 500 MG/1
500 TABLET, FILM COATED ORAL 4 TIMES DAILY PRN
Qty: 12 TABLET | Refills: 0 | Status: SHIPPED | OUTPATIENT
Start: 2025-07-21 | End: 2025-07-29

## 2025-07-21 RX ORDER — OXYCODONE HYDROCHLORIDE 5 MG/1
10 TABLET ORAL EVERY 4 HOURS PRN
Refills: 0 | Status: DISCONTINUED | OUTPATIENT
Start: 2025-07-21 | End: 2025-07-21 | Stop reason: HOSPADM

## 2025-07-21 RX ORDER — ACETAMINOPHEN 325 MG/1
650 TABLET ORAL EVERY 4 HOURS PRN
COMMUNITY
Start: 2025-07-21

## 2025-07-21 RX ORDER — ONDANSETRON 4 MG/1
4 TABLET, ORALLY DISINTEGRATING ORAL EVERY 8 HOURS PRN
Qty: 10 TABLET | Refills: 0 | Status: SHIPPED | OUTPATIENT
Start: 2025-07-21

## 2025-07-21 RX ORDER — CIPROFLOXACIN 500 MG/1
500 TABLET, FILM COATED ORAL 2 TIMES DAILY
Qty: 28 TABLET | Refills: 0 | Status: SHIPPED | OUTPATIENT
Start: 2025-07-21 | End: 2025-08-04

## 2025-07-21 RX ORDER — OXYCODONE HYDROCHLORIDE 5 MG/1
5 TABLET ORAL EVERY 4 HOURS PRN
Refills: 0 | Status: DISCONTINUED | OUTPATIENT
Start: 2025-07-21 | End: 2025-07-21 | Stop reason: HOSPADM

## 2025-07-21 RX ADMIN — MEROPENEM 500 MG: 500 INJECTION, POWDER, FOR SOLUTION INTRAVENOUS at 05:41

## 2025-07-21 RX ADMIN — ACETAMINOPHEN 650 MG: 325 TABLET ORAL at 15:58

## 2025-07-21 RX ADMIN — HYDROMORPHONE HYDROCHLORIDE 0.4 MG: 0.2 INJECTION, SOLUTION INTRAMUSCULAR; INTRAVENOUS; SUBCUTANEOUS at 00:59

## 2025-07-21 RX ADMIN — OXYCODONE HYDROCHLORIDE 10 MG: 5 TABLET ORAL at 07:00

## 2025-07-21 RX ADMIN — MEROPENEM 500 MG: 500 INJECTION, POWDER, FOR SOLUTION INTRAVENOUS at 12:00

## 2025-07-21 RX ADMIN — ONDANSETRON 4 MG: 4 TABLET, ORALLY DISINTEGRATING ORAL at 01:02

## 2025-07-21 RX ADMIN — ONDANSETRON 4 MG: 4 TABLET, ORALLY DISINTEGRATING ORAL at 17:48

## 2025-07-21 RX ADMIN — OXYCODONE HYDROCHLORIDE 10 MG: 5 TABLET ORAL at 16:40

## 2025-07-21 RX ADMIN — ACETAMINOPHEN 650 MG: 325 TABLET ORAL at 05:39

## 2025-07-21 RX ADMIN — MEROPENEM 500 MG: 500 INJECTION, POWDER, FOR SOLUTION INTRAVENOUS at 00:39

## 2025-07-21 RX ADMIN — ACETAMINOPHEN 650 MG: 325 TABLET ORAL at 09:41

## 2025-07-21 RX ADMIN — OXYCODONE HYDROCHLORIDE 5 MG: 5 TABLET ORAL at 11:29

## 2025-07-21 RX ADMIN — METHOCARBAMOL 500 MG: 500 TABLET ORAL at 14:01

## 2025-07-21 RX ADMIN — OXYCODONE HYDROCHLORIDE 5 MG: 5 TABLET ORAL at 12:23

## 2025-07-21 ASSESSMENT — ACTIVITIES OF DAILY LIVING (ADL)
ADLS_ACUITY_SCORE: 35

## 2025-07-21 NOTE — PLAN OF CARE
Orientation: A&O x4   Aggression Stop Light: green   Activity: independent  Diet/BS Checks: low fiber diet- tolerating well, no nausea.  Good appetite.   Tele:  n/a   IV Access/Drains: R PIV SL with intermittent antibiotics. New dressing.  Pain Management: PRN oxy, tylenol & robaxin.    Abnormal VS/Results: VSS on RA. K+ recheck for a.m.    Bowel/Bladder: continent  Skin/Wounds: intact   Consults: CRS  D/C Disposition: later today if tolerating food ok.   Other Info:

## 2025-07-21 NOTE — DISCHARGE SUMMARY
[facility]  Hospitalist Discharge Summary      Date of Admission:  7/17/2025  Date of Discharge:  7/21/2025  Discharging Provider: Mandy Bonilla DO  Discharge Service: Hospitalist Service    Discharge Diagnoses   Acute sigmoid diverticulitis with likely intramural abscess  Anemia  Leukocytosis  Low back pain  H/o Depression   H/o alcohol use    Clinically Significant Risk Factors          Follow-ups Needed After Discharge   Follow-up Appointments       Follow Up      Follow up with colorectal surgery in 2-3 weeks with repeat CT scan at that time. They will schedule an appointment in their office to discuss potential surgery in 6-8w  [facility]  [address]  [place] [address]  T: [phone]        Hospital Follow-up with Existing Primary Care Provider (PCP)          Schedule Primary Care visit within: 30 Days             Unresulted Labs Ordered in the Past 30 Days of this Admission       Date and Time Order Name Status Description    7/17/2025  5:30 PM Blood Culture Peripheral blood (BC) Arm, Left Preliminary     7/17/2025  5:30 PM Blood Culture Peripheral blood (BC) Arm, Right Preliminary         These results will be followed up by hospitalist results review. No suspicion of bacteremia. Received meropenem IV in hospital, discharged with cipro and flagyl for 2 more weeks on discharge    Discharge Disposition   Discharged to home  Condition at discharge: Stable    Hospital Course   Denia Raymond is a 40 year old female with depression and alcohol use history recent uncomplicated diverticulitis treated with antibiotic, presented to the ER due to abdominal pain, found to have acute sigmoid diverticulitis with likely abscess and admitted on 7/17/2025.       Acute sigmoid diverticulitis with likely intramural abscess  Patient in ER 7/6/2025 with 4 days of LLQ abdominal pain.    *7/6 CT AP: mild acute uncomplicated sigmoid diverticulitis.   Treated  with 7 days of ciprofloxacin and Flagyl.  Symptom improved initially, and now worse.  CBC CMP lipase normal, beta-hCG negative.    *7/17 CT AP now shows persistent mild diverticulitis of the proximal sigmoid colon with questionable developing intramural abscess.  No free air or drainable fluid.  - Cefepime and Flagyl given in ER given allergy.  Received meropenem for almost 4 days, then transitioned to oral cipro/flagyl BID for 14 more days on discharge  - low fiber diet for one week, then can liberalize  - tylenol for mild pain, oxycodone 5-10mg for mod-severe pain  - discharged with PRN zofran as well  - Colorectal surgery recommends CT A/P in 2-3w and then follow up in their office to discuss elective surgical intervention in 6-8 weeks after endoscopic evaluation given intramural abscess      Anemia  Hgb 11.1 on 7/18, likely dilutional, was 13.1 on admit. Recently on her period.  - hgb stable 11.4/11.5 on discharge    Leukocytosis  Noted on 7/21, but no fevers. Only change was discontinuation of IVF  - Continue abx as above    Low back pain  Likely positional from extended time in bed. Oxycodone helped  - PRN robaxin available for muscle spasm (also helped)     H/o Depression   Not currently taking medications     H/o alcohol use  - Sober for 4+ years    Consultations This Hospital Stay   COLORECTAL SURGERY IP CONSULT    Code Status   Full Code    Time Spent on this Encounter   IMandy DO, personally saw the patient today and spent greater than 30 minutes discharging this patient.       Mandy Bonilla DO  [place] [address] [facility]  [address]  [facility] [address]  Phone: [phone]  ______________________________________________________________________    Physical Exam   Vital Signs: Temp: 98.4  F (36.9  C) Temp src: Oral BP: 101/61 Pulse: 57   Resp: 13 SpO2: 98 % O2 Device: None (Room air)    Weight: 121 lbs 11.1 oz    Patient seen and examined. She is doing much  better. Last IV dilaudid requirement was 7/20 and she also felt she needed it because it was too soon for oxycodone overnight (which was originally available every 6 hours). Robaxin added for back pain.  Abdominal pain greatly improved. No recent BM    Constitutional: Awake, alert, cooperative, no apparent distress  Respiratory: Clear to auscultation bilaterally, no crackles or wheezing  Cardiovascular: Regular rate and rhythm, normal S1 and S2, and no murmur noted  GI: soft, not distended, nontender with light palpation other than in LLQ.  Skin/Integumen: No rashes, no cyanosis, no edema  Other:          Primary Care Physician   Avinash Dyer    Discharge Orders      Reason for your hospital stay    Diverticulitis with abscess     Activity    Your activity upon discharge: activity as tolerated     Follow Up    Follow up with colorectal surgery in 2-3 weeks with repeat CT scan at that time. They will schedule an appointment in their office to discuss potential surgery in 6-8w  [facility]  [address] [facility] [address]  [place] [address]  T: [phone]     Discharge Instructions    1. Take tylenol as needed for mild pain and use oxycodone 5-10mg for more severe pain. You have been taking 10mg tablets in the hospital, but you can cut these in half to take a 5mg dose if your pain starts to get better.    2. Continue to take ciprofloxacin and metronidazole twice daily for the next two weeks. Your first dose of both of these medications is around 7pm 7/21.    3. Continue to use heat packs for your back and your abdomen as needed for pain. Use as needed robaxin for muscle spasms/back pain. This can be combined with lower dose oxycodone 5mg--but might make you too sleepy if you take it with 10mg oxycodone.     Diet    Follow this diet upon discharge: low fiber diet for one week, then can liberalize to your regular diet     Hospital Follow-up with Existing Primary Care Provider (PCP)             Significant Results and Procedures   Most Recent 3 CBC's:  Recent Labs   Lab Test 07/21/25  0901 07/19/25  1304 07/18/25  0539   WBC 11.3* 8.4 7.2   HGB 11.4* 11.5* 11.1*   MCV 90 93 91    223 243     Most Recent 3 BMP's:  Recent Labs   Lab Test 07/21/25  0901 07/20/25  0651 07/19/25  1304    138 138   POTASSIUM 4.1 4.1 3.8   CHLORIDE 103 105 101   CO2 27 27 27   BUN 6.1 6.0 3.6*   CR 0.46* 0.58 0.56   ANIONGAP 7 6* 10   WANDA 8.7* 8.4* 8.4*   * 94 106*   ,   Results for orders placed or performed during the hospital encounter of 07/17/25   CT Abdomen Pelvis w Contrast    Narrative    EXAM: CT ABDOMEN PELVIS W CONTRAST  LOCATION: [facility]  DATE: 7/17/2025    INDICATION: recurrent lower abd pain, recent dx diverticulitis, no urinary symptoms  COMPARISON: CT 7/6/2025  TECHNIQUE: CT scan of the abdomen and pelvis was performed following injection of IV contrast. Multiplanar reformats were obtained. Dose reduction techniques were used.  CONTRAST: 60 mL Isovue 370    FINDINGS:   LOWER CHEST: Normal.    HEPATOBILIARY: 9 mm hypodense liver dome lesion may represent a cyst or hemangioma. Normal gallbladder and bile ducts.    PANCREAS: Normal.    SPLEEN: Normal.    ADRENAL GLANDS: Normal.    KIDNEYS/BLADDER: Normal.    BOWEL: Distal colonic diverticulosis. Persistent mild inflammatory changes of the proximal sigmoid colon. Questionable small developing intramural abscess measuring 1.0 x 0.9 x 0.7 cm, image numbers 133 series 4 and 26 series 5. No free air.    LYMPH NODES: Normal.    VASCULATURE: Normal.    PELVIC ORGANS: IUD. Intravaginal tampon.    MUSCULOSKELETAL: Normal.      Impression    IMPRESSION:   1.  Persistent mild diverticulitis of the proximal sigmoid colon. Questionable small developing intramural abscess within the inflamed colon. No drainable fluid collection and no free air.       Discharge Medications      Review of your medicines        START taking         Dose / Directions   acetaminophen 325 MG tablet  Commonly known as: TYLENOL      Dose: 650 mg  Take 2 tablets (650 mg) by mouth every 4 hours as needed for mild pain or other (and adjunct with moderate or severe pain or per patient request).  Refills: 0     ciprofloxacin 500 MG tablet  Commonly known as: Cipro      Dose: 500 mg  Take 1 tablet (500 mg) by mouth 2 times daily for 14 days.  Quantity: 28 tablet  Refills: 0     methocarbamol 500 MG tablet  Commonly known as: ROBAXIN      Dose: 500 mg  Take 1 tablet (500 mg) by mouth 4 times daily as needed for muscle spasms.  Quantity: 12 tablet  Refills: 0     metroNIDAZOLE 500 MG tablet  Commonly known as: FLAGYL      Dose: 500 mg  Take 1 tablet (500 mg) by mouth 3 times daily for 14 days.  Quantity: 42 tablet  Refills: 0     oxyCODONE IR 10 MG tablet  Commonly known as: ROXICODONE      Dose: 10 mg  Take 1 tablet (10 mg) by mouth every 4 hours as needed for severe pain (IF pain not managed with non-pharmacological and non-opioid interventions).  Quantity: 15 tablet  Refills: 0            CONTINUE these medicines which have NOT CHANGED        Dose / Directions   CALCIUM-MAGNESIUM-VITAMIN D PO      Dose: 1-3 tablet  Take 1-3 tablets by mouth nightly as needed.  Refills: 0     melatonin 3 MG tablet      Dose: 3 mg  Take 3 mg by mouth nightly as needed for sleep.  Refills: 0     naproxen sodium 220 MG tablet  Commonly known as: ANAPROX      Dose: 220 mg  Take 220 mg by mouth 2 times daily as needed for moderate pain.  Refills: 0     ondansetron 4 MG ODT tab  Commonly known as: ZOFRAN ODT      Dose: 4 mg  Take 1 tablet (4 mg) by mouth every 8 hours as needed for nausea.  Quantity: 10 tablet  Refills: 0               Where to get your medicines        These medications were sent to [facility] [place], [place] - [address] [place] [address]  [address] [address] [address]      Phone: [phone]   ciprofloxacin 500 MG tablet  methocarbamol 500 MG  tablet  metroNIDAZOLE 500 MG tablet  ondansetron 4 MG ODT tab  oxyCODONE IR 10 MG tablet       Some of these will need a paper prescription and others can be bought over the counter. Ask your nurse if you have questions.    You don't need a prescription for these medications  acetaminophen 325 MG tablet       Allergies   Allergies   Allergen Reactions    Penicillins Rash     rash

## 2025-07-21 NOTE — PLAN OF CARE
Shift summary: 1900-0730    Orientation: A&Ox4  Aggression Stop Light: Green  Activity: Independent  Diet/BS Checks: Low fiber  Tele: N/A  IV Access/Drains: R PIV w/int abx  Pain Management: IV Dilaudid x1, Oxy x2 and tylenol x2 for abdominal and back pain   Abnormal VS/Results: VSS, on RA  Bowel/Bladder: Continent of B&B  Skin/Wounds: Intact  Consults: CRS  D/C Disposition: Pending   Other Info:   -On K+ replacement, rechecks this AM

## 2025-07-21 NOTE — PROGRESS NOTES
Pt discharged home around 1830 with her partner as her ride. AVS and discharge meds sent home with pt. RPIV removed and belongings sent home with pt.

## 2025-07-21 NOTE — PROGRESS NOTES
COLON & RECT[facility]  PROGRESS NOTE    July 21, 2025      SUBJECTIVE:  Reports abdominal pain is improved today but did struggle with lower back pain last night - radiated to bilateral LE, denies numbnessor tingling. Last BM Saturday. Tolerating LFD, no n/v. VSS. WBC 11.3 (from 8.4, 7.2)    OBJECTIVE:  Temp:  [97.2  F (36.2  C)-98.6  F (37  C)] 97.7  F (36.5  C)  Pulse:  [62-71] 64  Resp:  [14-20] 14  BP: ()/(70-81) 117/73  SpO2:  [98 %-100 %] 100 %    Intake/Output Summary (Last 24 hours) at 7/21/2025 0803  Last data filed at 7/20/2025 2149  Gross per 24 hour   Intake 880 ml   Output --   Net 880 ml       GENERAL:  Awake, alert, no acute distress  HEAD: Normocephalic atraumatic  SCLERA: Anicteric  EXTREMITIES: Warm and well perfused  ABDOMEN:  Soft, mildly tender in LLQ, non-distended. No guarding, rigidity, or peritoneal signs.      LABS:  Lab Results   Component Value Date    WBC 8.4 07/19/2025     Lab Results   Component Value Date    HGB 11.5 07/19/2025     Lab Results   Component Value Date    HCT 34.3 07/19/2025     Lab Results   Component Value Date     07/19/2025     Last Basic Metabolic Panel:  Lab Results   Component Value Date     07/20/2025      Lab Results   Component Value Date    POTASSIUM 4.1 07/20/2025     Lab Results   Component Value Date    CHLORIDE 105 07/20/2025     Lab Results   Component Value Date    WANDA 8.4 07/20/2025     Lab Results   Component Value Date    CO2 27 07/20/2025     Lab Results   Component Value Date    BUN 6.0 07/20/2025     Lab Results   Component Value Date    CR 0.58 07/20/2025     Lab Results   Component Value Date    GLC 94 07/20/2025       ASSESSMENT/PLAN: Denia Raymond is a 40 year old female admitted with acute diverticulitis with a likely intramural abscess. She reports and improvement in abdominal pain today compared to yesterday but is still mildly focally tender in the LLQ on exam. As she has had an improvement in abdominal pain and  feels quite a bit better this am, will hold off on CT scan today.    -Low fiber diet. Advised her to continue this for at least one week.  - PRN pain meds. Transition to po meds  -Transition to po abx today, recommend a 14 day course given intramural abscess  -Will obtain a CT scan in 2-3 weeks as an outpatient to ensure resolution  -Will likely need elective surgical intervention in 6-8 weeks after endoscopic evaluation given intramural abscess  -If she is feeling well and continues to tolerate po this afternoon, can discharge later today.  -CRS will coordinate outpatient follow up    Discussed with Dr. Castellon.    For questions/paging, please contact the CRS office at [phone].    Kailey Ponce PA-C  Colorectal Physician Assistant    [facility]  [address] [address]  T: [phone]  F: [phone]

## 2025-07-22 LAB
BACTERIA SPEC CULT: NO GROWTH
BACTERIA SPEC CULT: NO GROWTH

## 2025-07-29 ENCOUNTER — VIRTUAL VISIT (OUTPATIENT)
Dept: FAMILY MEDICINE | Facility: CLINIC | Age: 40
End: 2025-07-29
Attending: HOSPITALIST
Payer: COMMERCIAL

## 2025-07-29 ENCOUNTER — MYC MEDICAL ADVICE (OUTPATIENT)
Dept: FAMILY MEDICINE | Facility: CLINIC | Age: 40
End: 2025-07-29

## 2025-07-29 DIAGNOSIS — K57.00: Primary | ICD-10-CM

## 2025-07-29 DIAGNOSIS — K65.1 INTRA-ABDOMINAL ABSCESS (H): ICD-10-CM

## 2025-07-29 PROCEDURE — 98005 SYNCH AUDIO-VIDEO EST LOW 20: CPT | Performed by: PHYSICIAN ASSISTANT

## 2025-07-29 PROCEDURE — 1111F DSCHRG MED/CURRENT MED MERGE: CPT | Performed by: PHYSICIAN ASSISTANT

## 2025-07-29 RX ORDER — METHOCARBAMOL 500 MG/1
500 TABLET, FILM COATED ORAL 4 TIMES DAILY PRN
Qty: 20 TABLET | Refills: 0 | Status: SHIPPED | OUTPATIENT
Start: 2025-07-29

## 2025-07-29 NOTE — PROGRESS NOTES
Denia is a 40 year old who is being evaluated via a billable video visit.    How would you like to obtain your AVS? MyChart  If the video visit is dropped, the invitation should be resent by: Text to cell phone: [phone]  Will anyone else be joining your video visit? No      Assessment & Plan     Diverticulitis of small intestine with abscess, unspecified bleeding status  She is overall feeling better, still some mild abdomen pain and nausea with eating, but getting better.  She was unsure of follow up plan, so I was able to call colorectal and confirmed her in office follow up visit on Aug 18 and schedule colonoscopy on Sept 9.  Off work note provided until her follow up with specialty.  - methocarbamol (ROBAXIN) 500 MG tablet; Take 1 tablet (500 mg) by mouth 4 times daily as needed for muscle spasms.    Intra-abdominal abscess (H)    MED REC REQUIRED  Post Medication Reconciliation Status:  Discharge medications reconciled, continue medications without change      Subjective   Denia is a 40 year old, presenting for the following health issues:  Hospital F/U        7/29/2025    12:06 PM   Additional Questions   Roomed by sigrid barber   Accompanied by self     HPI        Hospital Follow-up Visit:    Hospital/Nursing Home/IP Rehab Facility: [facility]  Most Recent Admission Date: 7/17/2025   Most Recent Admission Diagnosis: Diverticulitis - K57.92  Intra-abdominal abscess (H) - K65.1     Most Recent Discharge Date: 7/21/2025   Most Recent Discharge Diagnosis: Intra-abdominal abscess (H) - K65.1  Diverticulitis - K57.92  Diverticulitis of intestine with abscess - K57.80   Do you have any other stressors you would like to discuss with your provider? No    Problems taking medications regularly:  None  Medication changes since discharge: cipro for 14 days and a lot of other meds on meds list  Problems adhering to non-medication therapy:  None    Summary of hospitalization:  [facility]  "hospital discharge summary reviewed  Diagnostic Tests/Treatments reviewed.  Follow up needed: CT 2-3 weeks  Other Healthcare Providers Involved in Patient s Care:         Specialist appointment - upcoming with colorectal  Update since discharge: improved.       Aug 14 consult- [place]   Colonoscopy Sept 9  Plan of care communicated with patient                   Review of Systems  Constitutional, HEENT, cardiovascular, pulmonary, gi and gu systems are negative, except as otherwise noted.      Objective    Vitals - Patient Reported  Weight (Patient Reported): 48.1 kg (106 lb)  Height (Patient Reported): 160 cm (5' 3\")  BMI (Based on Pt Reported Ht/Wt): 18.78        Physical Exam   GENERAL: alert and no distress  EYES: Eyes grossly normal to inspection.  No discharge or erythema, or obvious scleral/conjunctival abnormalities.  RESP: No audible wheeze, cough, or visible cyanosis.    SKIN: Visible skin clear. No significant rash, abnormal pigmentation or lesions.  NEURO: Cranial nerves grossly intact.  Mentation and speech appropriate for age.  PSYCH: Appropriate affect, tone, and pace of words          Video-Visit Details    Type of service:  Video Visit   Originating Location (pt. Location): Home    Distant Location (provider location):  Off-site  Platform used for Video Visit: Gregorio  Signed Electronically by: Avinash Dyer PA-C    "

## 2025-07-29 NOTE — LETTER
2025    Denia Raymond   1985        To Whom it May Concern;    Please excuse Denia Raymond from work until her upcoming visit with specialty on Aug 14 where a longer term plan will be determined.  Sincerely,        Avinash Dyer PA-C

## 2025-08-07 ENCOUNTER — OFFICE VISIT (OUTPATIENT)
Dept: FAMILY MEDICINE | Facility: CLINIC | Age: 40
End: 2025-08-07
Payer: COMMERCIAL

## 2025-08-07 ENCOUNTER — ANCILLARY PROCEDURE (OUTPATIENT)
Dept: CT IMAGING | Facility: CLINIC | Age: 40
End: 2025-08-07
Payer: COMMERCIAL

## 2025-08-07 VITALS
DIASTOLIC BLOOD PRESSURE: 69 MMHG | OXYGEN SATURATION: 96 % | WEIGHT: 110 LBS | RESPIRATION RATE: 16 BRPM | HEART RATE: 90 BPM | TEMPERATURE: 98.8 F | BODY MASS INDEX: 19.49 KG/M2 | SYSTOLIC BLOOD PRESSURE: 119 MMHG

## 2025-08-07 DIAGNOSIS — K65.1 INTRA-ABDOMINAL ABSCESS (H): ICD-10-CM

## 2025-08-07 DIAGNOSIS — F41.9 ANXIETY: ICD-10-CM

## 2025-08-07 DIAGNOSIS — K57.00: Primary | ICD-10-CM

## 2025-08-07 PROCEDURE — 3078F DIAST BP <80 MM HG: CPT | Performed by: PHYSICIAN ASSISTANT

## 2025-08-07 PROCEDURE — 3074F SYST BP LT 130 MM HG: CPT | Performed by: PHYSICIAN ASSISTANT

## 2025-08-07 PROCEDURE — 74177 CT ABD & PELVIS W/CONTRAST: CPT | Mod: GC | Performed by: RADIOLOGY

## 2025-08-07 PROCEDURE — 1111F DSCHRG MED/CURRENT MED MERGE: CPT | Performed by: PHYSICIAN ASSISTANT

## 2025-08-07 PROCEDURE — 99213 OFFICE O/P EST LOW 20 MIN: CPT | Performed by: PHYSICIAN ASSISTANT

## 2025-08-07 RX ORDER — HYDROXYZINE HYDROCHLORIDE 25 MG/1
25 TABLET, FILM COATED ORAL 3 TIMES DAILY PRN
Qty: 45 TABLET | Refills: 1 | Status: SHIPPED | OUTPATIENT
Start: 2025-08-07

## 2025-08-07 RX ORDER — IOPAMIDOL 755 MG/ML
71 INJECTION, SOLUTION INTRAVASCULAR ONCE
Status: COMPLETED | OUTPATIENT
Start: 2025-08-07 | End: 2025-08-07

## 2025-08-07 RX ADMIN — IOPAMIDOL 71 ML: 755 INJECTION, SOLUTION INTRAVASCULAR at 10:04

## 2025-08-07 ASSESSMENT — ANXIETY QUESTIONNAIRES
2. NOT BEING ABLE TO STOP OR CONTROL WORRYING: NEARLY EVERY DAY
IF YOU CHECKED OFF ANY PROBLEMS ON THIS QUESTIONNAIRE, HOW DIFFICULT HAVE THESE PROBLEMS MADE IT FOR YOU TO DO YOUR WORK, TAKE CARE OF THINGS AT HOME, OR GET ALONG WITH OTHER PEOPLE: SOMEWHAT DIFFICULT
6. BECOMING EASILY ANNOYED OR IRRITABLE: NEARLY EVERY DAY
1. FEELING NERVOUS, ANXIOUS, OR ON EDGE: SEVERAL DAYS
3. WORRYING TOO MUCH ABOUT DIFFERENT THINGS: NEARLY EVERY DAY
5. BEING SO RESTLESS THAT IT IS HARD TO SIT STILL: SEVERAL DAYS
4. TROUBLE RELAXING: SEVERAL DAYS
GAD7 TOTAL SCORE: 15
8. IF YOU CHECKED OFF ANY PROBLEMS, HOW DIFFICULT HAVE THESE MADE IT FOR YOU TO DO YOUR WORK, TAKE CARE OF THINGS AT HOME, OR GET ALONG WITH OTHER PEOPLE?: SOMEWHAT DIFFICULT
7. FEELING AFRAID AS IF SOMETHING AWFUL MIGHT HAPPEN: NEARLY EVERY DAY
GAD7 TOTAL SCORE: 15
7. FEELING AFRAID AS IF SOMETHING AWFUL MIGHT HAPPEN: NEARLY EVERY DAY
GAD7 TOTAL SCORE: 15

## 2025-08-07 ASSESSMENT — PATIENT HEALTH QUESTIONNAIRE - PHQ9
SUM OF ALL RESPONSES TO PHQ QUESTIONS 1-9: 11
10. IF YOU CHECKED OFF ANY PROBLEMS, HOW DIFFICULT HAVE THESE PROBLEMS MADE IT FOR YOU TO DO YOUR WORK, TAKE CARE OF THINGS AT HOME, OR GET ALONG WITH OTHER PEOPLE: SOMEWHAT DIFFICULT
SUM OF ALL RESPONSES TO PHQ QUESTIONS 1-9: 11

## 2025-08-19 ENCOUNTER — VIRTUAL VISIT (OUTPATIENT)
Dept: FAMILY MEDICINE | Facility: CLINIC | Age: 40
End: 2025-08-19
Payer: COMMERCIAL

## 2025-08-19 DIAGNOSIS — K57.00: Primary | ICD-10-CM

## 2025-08-19 PROCEDURE — 98005 SYNCH AUDIO-VIDEO EST LOW 20: CPT | Performed by: PHYSICIAN ASSISTANT

## 2025-08-19 ASSESSMENT — PATIENT HEALTH QUESTIONNAIRE - PHQ9
SUM OF ALL RESPONSES TO PHQ QUESTIONS 1-9: 10
SUM OF ALL RESPONSES TO PHQ QUESTIONS 1-9: 10
10. IF YOU CHECKED OFF ANY PROBLEMS, HOW DIFFICULT HAVE THESE PROBLEMS MADE IT FOR YOU TO DO YOUR WORK, TAKE CARE OF THINGS AT HOME, OR GET ALONG WITH OTHER PEOPLE: SOMEWHAT DIFFICULT

## 2025-08-31 ENCOUNTER — MYC REFILL (OUTPATIENT)
Dept: FAMILY MEDICINE | Facility: CLINIC | Age: 40
End: 2025-08-31
Payer: COMMERCIAL

## 2025-08-31 DIAGNOSIS — F41.9 ANXIETY: ICD-10-CM

## 2025-09-02 RX ORDER — HYDROXYZINE HYDROCHLORIDE 25 MG/1
25 TABLET, FILM COATED ORAL 3 TIMES DAILY PRN
Qty: 270 TABLET | Refills: 1 | Status: SHIPPED | OUTPATIENT
Start: 2025-09-02